# Patient Record
Sex: MALE | Race: WHITE | NOT HISPANIC OR LATINO | URBAN - METROPOLITAN AREA
[De-identification: names, ages, dates, MRNs, and addresses within clinical notes are randomized per-mention and may not be internally consistent; named-entity substitution may affect disease eponyms.]

---

## 2017-11-01 ENCOUNTER — HOSPITAL ENCOUNTER (OUTPATIENT)
Dept: PAIN MEDICINE | Facility: HOSPITAL | Age: 42
Discharge: HOME | End: 2017-11-01
Attending: ANESTHESIOLOGY | Admitting: ANESTHESIOLOGY

## 2017-11-30 ENCOUNTER — HOSPITAL ENCOUNTER (OUTPATIENT)
Dept: PAIN MEDICINE | Facility: HOSPITAL | Age: 42
Discharge: HOME | End: 2017-11-30
Attending: ANESTHESIOLOGY | Admitting: ANESTHESIOLOGY

## 2019-12-05 ENCOUNTER — APPOINTMENT (OUTPATIENT)
Dept: CT IMAGING | Facility: HOSPITAL | Age: 44
End: 2019-12-05

## 2019-12-05 ENCOUNTER — APPOINTMENT (OUTPATIENT)
Dept: GENERAL RADIOLOGY | Facility: HOSPITAL | Age: 44
End: 2019-12-05

## 2019-12-05 ENCOUNTER — HOSPITAL ENCOUNTER (OUTPATIENT)
Facility: HOSPITAL | Age: 44
Setting detail: OBSERVATION
Discharge: HOME | End: 2019-12-07
Attending: INTERNAL MEDICINE | Admitting: INTERNAL MEDICINE

## 2019-12-05 DIAGNOSIS — R07.9 RIGHT-SIDED CHEST PAIN: ICD-10-CM

## 2019-12-05 DIAGNOSIS — R91.8 MASS OF RIGHT LUNG: Primary | ICD-10-CM

## 2019-12-05 DIAGNOSIS — R91.8 MASS OF MIDDLE LOBE OF RIGHT LUNG: ICD-10-CM

## 2019-12-05 LAB
ALBUMIN SERPL-MCNC: 4.4 G/DL (ref 3.5–5.2)
ALBUMIN/GLOB SERPL: 1.5 G/DL
ALP SERPL-CCNC: 71 U/L (ref 39–117)
ALT SERPL W P-5'-P-CCNC: 7 U/L (ref 1–41)
ANION GAP SERPL CALCULATED.3IONS-SCNC: 10 MMOL/L (ref 5–15)
AST SERPL-CCNC: 13 U/L (ref 1–40)
BASOPHILS # BLD AUTO: 0.1 10*3/MM3 (ref 0–0.2)
BASOPHILS NFR BLD AUTO: 0.7 % (ref 0–1.5)
BILIRUB SERPL-MCNC: 0.5 MG/DL (ref 0.2–1.2)
BUN BLD-MCNC: 10 MG/DL (ref 6–20)
BUN/CREAT SERPL: 13 (ref 7–25)
CALCIUM SPEC-SCNC: 9.4 MG/DL (ref 8.6–10.5)
CHLORIDE SERPL-SCNC: 99 MMOL/L (ref 98–107)
CO2 SERPL-SCNC: 29 MMOL/L (ref 22–29)
CREAT BLD-MCNC: 0.77 MG/DL (ref 0.76–1.27)
D DIMER PPP FEU-MCNC: 0.85 MCGFEU/ML (ref 0.17–0.59)
DEPRECATED RDW RBC AUTO: 41.1 FL (ref 37–54)
EOSINOPHIL # BLD AUTO: 0.3 10*3/MM3 (ref 0–0.4)
EOSINOPHIL NFR BLD AUTO: 3 % (ref 0.3–6.2)
ERYTHROCYTE [DISTWIDTH] IN BLOOD BY AUTOMATED COUNT: 12.6 % (ref 12.3–15.4)
GFR SERPL CREATININE-BSD FRML MDRD: 109 ML/MIN/1.73
GLOBULIN UR ELPH-MCNC: 2.9 GM/DL
GLUCOSE BLD-MCNC: 140 MG/DL (ref 65–99)
HCT VFR BLD AUTO: 42.1 % (ref 37.5–51)
HGB BLD-MCNC: 14.8 G/DL (ref 13–17.7)
HOLD SPECIMEN: NORMAL
HOLD SPECIMEN: NORMAL
LYMPHOCYTES # BLD AUTO: 1.9 10*3/MM3 (ref 0.7–3.1)
LYMPHOCYTES NFR BLD AUTO: 21.5 % (ref 19.6–45.3)
MCH RBC QN AUTO: 32.5 PG (ref 26.6–33)
MCHC RBC AUTO-ENTMCNC: 35.2 G/DL (ref 31.5–35.7)
MCV RBC AUTO: 92.4 FL (ref 79–97)
MONOCYTES # BLD AUTO: 0.9 10*3/MM3 (ref 0.1–0.9)
MONOCYTES NFR BLD AUTO: 9.9 % (ref 5–12)
NEUTROPHILS # BLD AUTO: 5.7 10*3/MM3 (ref 1.7–7)
NEUTROPHILS NFR BLD AUTO: 64.9 % (ref 42.7–76)
NRBC BLD AUTO-RTO: 0.1 /100 WBC (ref 0–0.2)
PLATELET # BLD AUTO: 226 10*3/MM3 (ref 140–450)
PMV BLD AUTO: 7.4 FL (ref 6–12)
POTASSIUM BLD-SCNC: 3.6 MMOL/L (ref 3.5–5.2)
PROT SERPL-MCNC: 7.3 G/DL (ref 6–8.5)
RBC # BLD AUTO: 4.56 10*6/MM3 (ref 4.14–5.8)
SODIUM BLD-SCNC: 138 MMOL/L (ref 136–145)
TROPONIN T SERPL-MCNC: <0.01 NG/ML (ref 0–0.03)
WBC NRBC COR # BLD: 8.7 10*3/MM3 (ref 3.4–10.8)
WHOLE BLOOD HOLD SPECIMEN: NORMAL
WHOLE BLOOD HOLD SPECIMEN: NORMAL

## 2019-12-05 PROCEDURE — 99284 EMERGENCY DEPT VISIT MOD MDM: CPT

## 2019-12-05 PROCEDURE — 80053 COMPREHEN METABOLIC PANEL: CPT | Performed by: NURSE PRACTITIONER

## 2019-12-05 PROCEDURE — 84484 ASSAY OF TROPONIN QUANT: CPT | Performed by: NURSE PRACTITIONER

## 2019-12-05 PROCEDURE — 85379 FIBRIN DEGRADATION QUANT: CPT | Performed by: NURSE PRACTITIONER

## 2019-12-05 PROCEDURE — 0 IOPAMIDOL PER 1 ML: Performed by: NURSE PRACTITIONER

## 2019-12-05 PROCEDURE — 71045 X-RAY EXAM CHEST 1 VIEW: CPT

## 2019-12-05 PROCEDURE — 85025 COMPLETE CBC W/AUTO DIFF WBC: CPT | Performed by: NURSE PRACTITIONER

## 2019-12-05 PROCEDURE — 93005 ELECTROCARDIOGRAM TRACING: CPT | Performed by: INTERNAL MEDICINE

## 2019-12-05 PROCEDURE — 71275 CT ANGIOGRAPHY CHEST: CPT

## 2019-12-05 PROCEDURE — 93005 ELECTROCARDIOGRAM TRACING: CPT

## 2019-12-05 RX ORDER — SODIUM CHLORIDE 0.9 % (FLUSH) 0.9 %
10 SYRINGE (ML) INJECTION AS NEEDED
Status: DISCONTINUED | OUTPATIENT
Start: 2019-12-05 | End: 2019-12-07 | Stop reason: HOSPADM

## 2019-12-05 RX ORDER — MORPHINE SULFATE 4 MG/ML
4 INJECTION, SOLUTION INTRAMUSCULAR; INTRAVENOUS ONCE
Status: COMPLETED | OUTPATIENT
Start: 2019-12-05 | End: 2019-12-06

## 2019-12-05 RX ORDER — ONDANSETRON 2 MG/ML
4 INJECTION INTRAMUSCULAR; INTRAVENOUS ONCE
Status: COMPLETED | OUTPATIENT
Start: 2019-12-05 | End: 2019-12-06

## 2019-12-05 RX ADMIN — IOPAMIDOL 72 ML: 755 INJECTION, SOLUTION INTRAVENOUS at 23:04

## 2019-12-06 PROBLEM — Z72.0 TOBACCO USE: Chronic | Status: ACTIVE | Noted: 2019-12-06

## 2019-12-06 PROBLEM — R91.8 MASS OF RIGHT LUNG: Status: ACTIVE | Noted: 2019-12-06

## 2019-12-06 LAB
ANION GAP SERPL CALCULATED.3IONS-SCNC: 9 MMOL/L (ref 5–15)
BASOPHILS # BLD AUTO: 0.1 10*3/MM3 (ref 0–0.2)
BASOPHILS NFR BLD AUTO: 0.8 % (ref 0–1.5)
BUN BLD-MCNC: 8 MG/DL (ref 6–20)
BUN/CREAT SERPL: 11.8 (ref 7–25)
CALCIUM SPEC-SCNC: 9.2 MG/DL (ref 8.6–10.5)
CHLORIDE SERPL-SCNC: 102 MMOL/L (ref 98–107)
CHOLEST SERPL-MCNC: 160 MG/DL (ref 0–200)
CO2 SERPL-SCNC: 27 MMOL/L (ref 22–29)
CREAT BLD-MCNC: 0.68 MG/DL (ref 0.76–1.27)
DEPRECATED RDW RBC AUTO: 41.1 FL (ref 37–54)
EOSINOPHIL # BLD AUTO: 0.2 10*3/MM3 (ref 0–0.4)
EOSINOPHIL NFR BLD AUTO: 2.7 % (ref 0.3–6.2)
ERYTHROCYTE [DISTWIDTH] IN BLOOD BY AUTOMATED COUNT: 12.4 % (ref 12.3–15.4)
GFR SERPL CREATININE-BSD FRML MDRD: 126 ML/MIN/1.73
GLUCOSE BLD-MCNC: 103 MG/DL (ref 65–99)
HBA1C MFR BLD: 4.9 % (ref 3.5–5.6)
HCT VFR BLD AUTO: 43.2 % (ref 37.5–51)
HDLC SERPL-MCNC: 54 MG/DL (ref 40–60)
HGB BLD-MCNC: 14.9 G/DL (ref 13–17.7)
LDLC SERPL CALC-MCNC: 95 MG/DL (ref 0–100)
LDLC/HDLC SERPL: 1.76 {RATIO}
LYMPHOCYTES # BLD AUTO: 1.3 10*3/MM3 (ref 0.7–3.1)
LYMPHOCYTES NFR BLD AUTO: 15.3 % (ref 19.6–45.3)
MAGNESIUM SERPL-MCNC: 2 MG/DL (ref 1.6–2.6)
MCH RBC QN AUTO: 32.6 PG (ref 26.6–33)
MCHC RBC AUTO-ENTMCNC: 34.5 G/DL (ref 31.5–35.7)
MCV RBC AUTO: 94.5 FL (ref 79–97)
MONOCYTES # BLD AUTO: 0.6 10*3/MM3 (ref 0.1–0.9)
MONOCYTES NFR BLD AUTO: 6.8 % (ref 5–12)
NEUTROPHILS # BLD AUTO: 6.5 10*3/MM3 (ref 1.7–7)
NEUTROPHILS NFR BLD AUTO: 74.4 % (ref 42.7–76)
NRBC BLD AUTO-RTO: 0 /100 WBC (ref 0–0.2)
PHOSPHATE SERPL-MCNC: 2.7 MG/DL (ref 2.5–4.5)
PLATELET # BLD AUTO: 220 10*3/MM3 (ref 140–450)
PMV BLD AUTO: 7.6 FL (ref 6–12)
POTASSIUM BLD-SCNC: 4.3 MMOL/L (ref 3.5–5.2)
RBC # BLD AUTO: 4.57 10*6/MM3 (ref 4.14–5.8)
SODIUM BLD-SCNC: 138 MMOL/L (ref 136–145)
TRIGL SERPL-MCNC: 54 MG/DL (ref 0–150)
TROPONIN T SERPL-MCNC: <0.01 NG/ML (ref 0–0.03)
TROPONIN T SERPL-MCNC: <0.01 NG/ML (ref 0–0.03)
TSH SERPL DL<=0.05 MIU/L-ACNC: 2.69 UIU/ML (ref 0.27–4.2)
VLDLC SERPL-MCNC: 10.8 MG/DL
WBC NRBC COR # BLD: 8.7 10*3/MM3 (ref 3.4–10.8)

## 2019-12-06 PROCEDURE — 84100 ASSAY OF PHOSPHORUS: CPT | Performed by: PHYSICIAN ASSISTANT

## 2019-12-06 PROCEDURE — 96365 THER/PROPH/DIAG IV INF INIT: CPT

## 2019-12-06 PROCEDURE — 84443 ASSAY THYROID STIM HORMONE: CPT | Performed by: PHYSICIAN ASSISTANT

## 2019-12-06 PROCEDURE — 96375 TX/PRO/DX INJ NEW DRUG ADDON: CPT

## 2019-12-06 PROCEDURE — G0378 HOSPITAL OBSERVATION PER HR: HCPCS

## 2019-12-06 PROCEDURE — 25010000002 MORPHINE PER 10 MG: Performed by: NURSE PRACTITIONER

## 2019-12-06 PROCEDURE — 25010000002 CEFTRIAXONE PER 250 MG: Performed by: NURSE PRACTITIONER

## 2019-12-06 PROCEDURE — 80061 LIPID PANEL: CPT | Performed by: PHYSICIAN ASSISTANT

## 2019-12-06 PROCEDURE — 99220 PR INITIAL OBSERVATION CARE/DAY 70 MINUTES: CPT | Performed by: INTERNAL MEDICINE

## 2019-12-06 PROCEDURE — 84484 ASSAY OF TROPONIN QUANT: CPT | Performed by: PHYSICIAN ASSISTANT

## 2019-12-06 PROCEDURE — 96366 THER/PROPH/DIAG IV INF ADDON: CPT

## 2019-12-06 PROCEDURE — 80048 BASIC METABOLIC PNL TOTAL CA: CPT | Performed by: PHYSICIAN ASSISTANT

## 2019-12-06 PROCEDURE — 83036 HEMOGLOBIN GLYCOSYLATED A1C: CPT | Performed by: PHYSICIAN ASSISTANT

## 2019-12-06 PROCEDURE — 96376 TX/PRO/DX INJ SAME DRUG ADON: CPT

## 2019-12-06 PROCEDURE — 83735 ASSAY OF MAGNESIUM: CPT | Performed by: PHYSICIAN ASSISTANT

## 2019-12-06 PROCEDURE — 25010000002 ONDANSETRON PER 1 MG: Performed by: NURSE PRACTITIONER

## 2019-12-06 PROCEDURE — 85025 COMPLETE CBC W/AUTO DIFF WBC: CPT | Performed by: PHYSICIAN ASSISTANT

## 2019-12-06 RX ORDER — DOXYCYCLINE 100 MG/1
100 TABLET ORAL EVERY 12 HOURS SCHEDULED
Status: DISCONTINUED | OUTPATIENT
Start: 2019-12-06 | End: 2019-12-07 | Stop reason: HOSPADM

## 2019-12-06 RX ORDER — ACETAMINOPHEN 650 MG/1
650 SUPPOSITORY RECTAL EVERY 4 HOURS PRN
Status: DISCONTINUED | OUTPATIENT
Start: 2019-12-06 | End: 2019-12-07 | Stop reason: HOSPADM

## 2019-12-06 RX ORDER — NICOTINE 21 MG/24HR
1 PATCH, TRANSDERMAL 24 HOURS TRANSDERMAL
Status: DISCONTINUED | OUTPATIENT
Start: 2019-12-06 | End: 2019-12-07 | Stop reason: HOSPADM

## 2019-12-06 RX ORDER — ACETAMINOPHEN 325 MG/1
650 TABLET ORAL EVERY 4 HOURS PRN
Status: DISCONTINUED | OUTPATIENT
Start: 2019-12-06 | End: 2019-12-07 | Stop reason: HOSPADM

## 2019-12-06 RX ORDER — CHOLECALCIFEROL (VITAMIN D3) 125 MCG
5 CAPSULE ORAL NIGHTLY PRN
Status: DISCONTINUED | OUTPATIENT
Start: 2019-12-06 | End: 2019-12-07 | Stop reason: HOSPADM

## 2019-12-06 RX ORDER — SODIUM CHLORIDE 0.9 % (FLUSH) 0.9 %
10 SYRINGE (ML) INJECTION EVERY 12 HOURS SCHEDULED
Status: DISCONTINUED | OUTPATIENT
Start: 2019-12-06 | End: 2019-12-07 | Stop reason: HOSPADM

## 2019-12-06 RX ORDER — BISACODYL 10 MG
10 SUPPOSITORY, RECTAL RECTAL DAILY PRN
Status: DISCONTINUED | OUTPATIENT
Start: 2019-12-06 | End: 2019-12-07 | Stop reason: HOSPADM

## 2019-12-06 RX ORDER — DOCUSATE SODIUM 100 MG/1
100 CAPSULE, LIQUID FILLED ORAL 2 TIMES DAILY PRN
Status: DISCONTINUED | OUTPATIENT
Start: 2019-12-06 | End: 2019-12-07 | Stop reason: HOSPADM

## 2019-12-06 RX ORDER — SODIUM CHLORIDE 0.9 % (FLUSH) 0.9 %
10 SYRINGE (ML) INJECTION AS NEEDED
Status: DISCONTINUED | OUTPATIENT
Start: 2019-12-06 | End: 2019-12-07 | Stop reason: HOSPADM

## 2019-12-06 RX ORDER — CALCIUM CARBONATE 200(500)MG
1 TABLET,CHEWABLE ORAL 2 TIMES DAILY PRN
Status: DISCONTINUED | OUTPATIENT
Start: 2019-12-06 | End: 2019-12-07 | Stop reason: HOSPADM

## 2019-12-06 RX ORDER — NITROGLYCERIN 0.4 MG/1
0.4 TABLET SUBLINGUAL
Status: DISCONTINUED | OUTPATIENT
Start: 2019-12-06 | End: 2019-12-07 | Stop reason: HOSPADM

## 2019-12-06 RX ORDER — ONDANSETRON 2 MG/ML
4 INJECTION INTRAMUSCULAR; INTRAVENOUS EVERY 6 HOURS PRN
Status: DISCONTINUED | OUTPATIENT
Start: 2019-12-06 | End: 2019-12-07 | Stop reason: HOSPADM

## 2019-12-06 RX ORDER — IPRATROPIUM BROMIDE AND ALBUTEROL SULFATE 2.5; .5 MG/3ML; MG/3ML
3 SOLUTION RESPIRATORY (INHALATION) EVERY 4 HOURS PRN
Status: DISCONTINUED | OUTPATIENT
Start: 2019-12-06 | End: 2019-12-07 | Stop reason: HOSPADM

## 2019-12-06 RX ORDER — ACETAMINOPHEN 160 MG/5ML
650 SOLUTION ORAL EVERY 4 HOURS PRN
Status: DISCONTINUED | OUTPATIENT
Start: 2019-12-06 | End: 2019-12-07 | Stop reason: HOSPADM

## 2019-12-06 RX ORDER — ONDANSETRON 4 MG/1
4 TABLET, FILM COATED ORAL EVERY 6 HOURS PRN
Status: DISCONTINUED | OUTPATIENT
Start: 2019-12-06 | End: 2019-12-07 | Stop reason: HOSPADM

## 2019-12-06 RX ADMIN — MORPHINE SULFATE 4 MG: 4 INJECTION INTRAVENOUS at 00:09

## 2019-12-06 RX ADMIN — CEFTRIAXONE SODIUM 1 G: 10 INJECTION, POWDER, FOR SOLUTION INTRAVENOUS at 00:09

## 2019-12-06 RX ADMIN — Medication 10 ML: at 09:32

## 2019-12-06 RX ADMIN — ACETAMINOPHEN 650 MG: 325 TABLET ORAL at 20:39

## 2019-12-06 RX ADMIN — DOXYCYCLINE 100 MG: 100 INJECTION, POWDER, LYOPHILIZED, FOR SOLUTION INTRAVENOUS at 10:24

## 2019-12-06 RX ADMIN — NICOTINE 1 PATCH: 21 PATCH TRANSDERMAL at 18:41

## 2019-12-06 RX ADMIN — Medication 10 ML: at 20:40

## 2019-12-06 RX ADMIN — ONDANSETRON 4 MG: 2 INJECTION INTRAMUSCULAR; INTRAVENOUS at 00:09

## 2019-12-06 RX ADMIN — DOXYCYCLINE 100 MG: 100 INJECTION, POWDER, LYOPHILIZED, FOR SOLUTION INTRAVENOUS at 01:46

## 2019-12-06 RX ADMIN — MELATONIN TAB 5 MG 5 MG: 5 TAB at 23:40

## 2019-12-06 RX ADMIN — Medication 10 ML: at 01:47

## 2019-12-06 RX ADMIN — DOXYCYCLINE 100 MG: 100 TABLET, FILM COATED ORAL at 20:39

## 2019-12-06 NOTE — PROGRESS NOTES
Discharge Planning Assessment   Jan     Patient Name: Kendrick Medina  MRN: [id number]  Today's Date: 12/6/2019    Admit Date: 12/5/2019    Discharge Needs Assessment     Row Name 12/06/19 0815       Living Environment    Lives With  spouse    Name(s) of Who Lives With Patient  yayo     Current Living Arrangements  home/apartment/condo    Primary Care Provided by  self    Provides Primary Care For  no one    Family Caregiver if Needed  none    Quality of Family Relationships  supportive    Able to Return to Prior Arrangements  yes       Resource/Environmental Concerns    Resource/Environmental Concerns  none    Transportation Concerns  car, none       Transition Planning    Patient/Family Anticipates Transition to  home with family    Patient/Family Anticipated Services at Transition  none    Transportation Anticipated  family or friend will provide       Discharge Needs Assessment    Readmission Within the Last 30 Days  no previous admission in last 30 days    Concerns to be Addressed  no discharge needs identified;denies needs/concerns at this time    Equipment Currently Used at Home  none    Anticipated Changes Related to Illness  none    Equipment Needed After Discharge  none        Discharge Plan     Row Name 12/06/19 0815       Plan    Plan  DC plan: Return home with spouse    Patient/Family in Agreement with Plan  yes    Plan Comments  Patient states he lives with his spouse.  Reports he still drives.  Denies taking any medications.  Denies any needs at this time.           Demographic Summary     Row Name 12/06/19 0814       General Information    Admission Type  observation    Arrived From  emergency department    Referral Source  admission list    Reason for Consult  discharge planning    Preferred Language  English     Used During This Interaction  no        Functional Status     Row Name 12/06/19 0814       Functional Status    Usual Activity Tolerance  excellent    Current Activity  Tolerance  excellent       Functional Status, IADL    Medications  independent    Meal Preparation  independent    Housekeeping  independent    Laundry  independent    Shopping  independent       Mental Status    General Appearance WDL  WDL       Mental Status Summary    Recent Changes in Mental Status/Cognitive Functioning  no changes       Employment/    Employment Status  employed full time          RETURN CONTACT  JAVIER CHANCE RN  [facility] /    PH: [phone]  FX: [phone]

## 2019-12-06 NOTE — PLAN OF CARE
Problem: Patient Care Overview  Goal: Plan of Care Review  Outcome: Ongoing (interventions implemented as appropriate)   12/06/19 0533   Coping/Psychosocial   Plan of Care Reviewed With patient   Coping/Psychosocial   Patient Agreement with Plan of Care agrees   Plan of Care Review   Progress no change   OTHER   Outcome Summary Patient slept throughout the night. Patient voiced no new concerns and no new physical changes noted.     Goal: Individualization and Mutuality  Outcome: Ongoing (interventions implemented as appropriate)    Goal: Discharge Needs Assessment  Outcome: Ongoing (interventions implemented as appropriate)      Problem: Pain, Acute (Adult)  Goal: Identify Related Risk Factors and Signs and Symptoms  Outcome: Outcome(s) achieved Date Met: 12/06/19    Goal: Acceptable Pain Control/Comfort Level  Outcome: Outcome(s) achieved Date Met: 12/06/19

## 2019-12-06 NOTE — ED PROVIDER NOTES
Subjective   Patient is a 44-year-old white male with no significant medical history he smokes a pack per day presents today with complaints of right-sided chest pain.  He states his symptoms started mildly 1 week ago but is progressively worsened.  He states it radiates into his right shoulder blade.  He reports his pain is worse when he lies down, bends over or moves.  Also complains of pain with inspiration.  He denies any shortness of breath.  He states he developed a nonproductive cough approximately 4 hours ago.  Denies any fever chills nausea or vomiting.  Denies any lower extremity pain or edema.            Review of Systems   Constitutional: Negative for chills and fever.   HENT: Negative for congestion.    Respiratory: Positive for cough. Negative for shortness of breath.    Cardiovascular: Positive for chest pain. Negative for leg swelling.   Gastrointestinal: Negative for nausea and vomiting.       Past Medical History:   Diagnosis Date   • History of degenerative disc disease        Allergies   Allergen Reactions   • Celecoxib Swelling     Hands and face        Past Surgical History:   Procedure Laterality Date   • CARPAL TUNNEL RELEASE WITH CUBITAL TUNNEL RELEASE         History reviewed. No pertinent family history.    Social History     Socioeconomic History   • Marital status: Single     Spouse name: Not on file   • Number of children: Not on file   • Years of education: Not on file   • Highest education level: Not on file   Tobacco Use   • Smoking status: Current Every Day Smoker     Packs/day: 1.00     Types: Cigarettes   • Smokeless tobacco: Never Used   Substance and Sexual Activity   • Alcohol use: Yes     Alcohol/week: 1.2 oz     Types: 2 Cans of beer per week     Frequency: Never     Comment: daily   • Drug use: No           Objective   Physical Exam   Constitutional: He appears well-developed.   Vital signs and triage nurse note reviewed.  Constitutional: Awake, alert; well-developed and  thin. No acute distress is noted.  HEENT: Normocephalic, atraumatic; pupils are PERRL with intact EOM; oropharynx is pink and moist without exudate or erythema.  No drooling or pooling of oral secretions.  Neck: Supple, full range of motion without pain; no cervical lymphadenopathy. Normal phonation.  Cardiovascular: Regular rate and rhythm, normal S1-S2.  No murmur noted.  Pulmonary: Respiratory effort regular nonlabored, breath sounds clear to auscultation all fields.  Abdomen: Soft, nontender, nondistended with normoactive bowel sounds; no rebound or guarding.  Musculoskeletal: Independent range of motion of all extremities with no palpable tenderness or edema.  Calves are symmetric and nontender.  Neuro: Alert oriented x3, speech is clear and appropriate, GCS 15.    Skin: Flesh tone, warm, dry, intact; no erythematous or petechial rash or lesion.        Procedures           ED Course      Labs Reviewed   COMPREHENSIVE METABOLIC PANEL - Abnormal; Notable for the following components:       Result Value    Glucose 140 (*)     All other components within normal limits    Narrative:     GFR Normal >60  Chronic Kidney Disease <60  Kidney Failure <15   D-DIMER, QUANTITATIVE - Abnormal; Notable for the following components:    D-Dimer, Quantitative 0.85 (*)     All other components within normal limits    Narrative:     Reference Range  --------------------------------------------------------------------     < 0.50   Negative Predictive Value  0.50-0.59   Indeterminate    >= 0.60   Probable VTE             A very low percentage of patients with DVT may yield D-Dimer results   below the cut-off of 0.50 MCGFEU/mL.  This is known to be more   prevalent in patients with distal DVT.             Results of this test should always be interpreted in conjunction with   the patient's medical history, clinical presentation and other   findings.  Clinical diagnosis should not be based on the result of   INNOVANCE D-Dimer alone.    TROPONIN (IN-HOUSE) - Normal    Narrative:     Troponin T Reference Range:  <= 0.03 ng/mL-   Negative for AMI  >0.03 ng/mL-     Abnormal for myocardial necrosis.  Clinicians would have to utilize clinical acumen, EKG, Troponin and serial changes to determine if it is an Acute Myocardial Infarction or myocardial injury due to an underlying chronic condition.    CBC WITH AUTO DIFFERENTIAL - Normal   RAINBOW DRAW    Narrative:     The following orders were created for panel order [place] Draw.  Procedure                               Abnormality         Status                     ---------                               -----------         ------                     Light Blue Top[[id number]]                                   Final result               Green Top (Gel)[[id number]]                                  Final result               Lavender Top[[id number]]                                     Final result               Gold Top - SST[[id number]]                                   Final result                 Please view results for these tests on the individual orders.   BASIC METABOLIC PANEL   HEMOGLOBIN A1C   LIPID PANEL   TSH   MAGNESIUM   PHOSPHORUS   CBC WITH AUTO DIFFERENTIAL   LIGHT BLUE TOP   GREEN TOP   LAVENDER TOP   GOLD TOP - SST   CBC AND DIFFERENTIAL    Narrative:     The following orders were created for panel order CBC & Differential.  Procedure                               Abnormality         Status                     ---------                               -----------         ------                     CBC Auto Differential[[id number]]        Normal              Final result                 Please view results for these tests on the individual orders.   CBC AND DIFFERENTIAL    Narrative:     The following orders were created for panel order CBC & Differential.  Procedure                               Abnormality         Status                     ---------                               -----------          ------                     CBC Auto Differential[[id number]]                                                         Please view results for these tests on the individual orders.     Xr Chest 1 View    Result Date: 12/5/2019  No active cardiopulmonary disease.   Electronically Signed By-Billy George DO. On:12/5/2019 10:42 PM This report was finalized on [id number] by  Billy George DO..    Ct Chest Pulmonary Embolism    Result Date: 12/5/2019  1.  There is a 4 x 3.2 x 2.9 cm mass medially in the right middle lobe.  This could represent consolidated pneumonia or tumor.Lesion is 33 Hounsfield units in density, too high to be a simple pericardial cyst. 2.  No evidence of pulmonary embolism. Electronically signed by:  Alfonso Ramirez M.D.  12/5/2019 9:32 PM    Medications   sodium chloride 0.9 % flush 10 mL (not administered)   sodium chloride 0.9 % flush 10 mL (not administered)   doxycycline (VIBRAMYCIN) 100 mg in sodium chloride 0.9 % 100 mL IVPB (not administered)   sodium chloride 0.9 % flush 10 mL (not administered)   sodium chloride 0.9 % flush 10 mL (not administered)   docusate sodium (COLACE) capsule 100 mg (not administered)   bisacodyl (DULCOLAX) suppository 10 mg (not administered)   magnesium hydroxide (MILK OF MAGNESIA) suspension 2400 mg/10mL 10 mL (not administered)   ondansetron (ZOFRAN) tablet 4 mg (not administered)     Or   ondansetron (ZOFRAN) injection 4 mg (not administered)   calcium carbonate (TUMS) chewable tablet 500 mg (200 mg elemental) (not administered)   nitroglycerin (NITROSTAT) SL tablet 0.4 mg (not administered)   acetaminophen (TYLENOL) tablet 650 mg (not administered)     Or   acetaminophen (TYLENOL) 160 MG/5ML solution 650 mg (not administered)     Or   acetaminophen (TYLENOL) suppository 650 mg (not administered)   melatonin tablet 5 mg (not administered)   iopamidol (ISOVUE-370) 76 % injection 100 mL (72 mL Intravenous Given 12/5/19 7017)   cefTRIAXone (ROCEPHIN) in  SWFI 1 gram/10ml IV PUSH syringe (1 g Intravenous Given 12/6/19 0009)   Morphine sulfate (PF) injection 4 mg (4 mg Intravenous Given 12/6/19 0009)   ondansetron (ZOFRAN) injection 4 mg (4 mg Intravenous Given 12/6/19 0009)                 MDM  Number of Diagnoses or Management Options  Mass of middle lobe of right lung:   Right-sided chest pain:      Amount and/or Complexity of Data Reviewed  Clinical lab tests: ordered and reviewed  Tests in the radiology section of CPT®: ordered and reviewed    Risk of Complications, Morbidity, and/or Mortality  General comments: Comorbidities: Smoker  Differentials: Pneumonia, pneumothorax, tumor mass, PE, muscle strain;this list is not all inclusive and does not constitute the entirety of considered causes    Patient is placed on continuous cardiac monitor.  He had IV established.  He had labs, EKG chest x-ray obtained.  D-dimer returned slightly elevated this patient had CT PE protocol obtained.    Patient started on IV Rocephin and doxycycline.  Vital signs are stable.  He was also given some morphine and Zofran for pain.    He will be admitted to the hospital for further eval and treatment.  He is discussed with hospitalist nurse practitioner.  Diagnosis and treatment plan discussed with patient.  Patient agreeable to plan.         Patient Progress  Patient progress: stable      Final diagnoses:   Right-sided chest pain   Mass of middle lobe of right lung              Nasima Gonzales, JORGE  12/06/19 0140

## 2019-12-06 NOTE — ASSESSMENT & PLAN NOTE
"- admission vitals were 98.2F, 112HR, 18RR, 111/70, 97%RA.   - On labs, patient found to have negative troponin, elevated D-dimer 0.85, stable CMP, and stable CBC.   - CXR shows No acute disease.   - CT PE shows \"1.  There is a 4 x 3.2 x 2.9 cm mass medially in the right middle lobe.  This could represent consolidated pneumonia or tumor. Lesion is 33 Hounsfield units in density, too high to be a simple pericardial cyst. 2.  No evidence of pulmonary embolism.\"   - EKG shows sinus tachycardia without ST-T changes appreciated.   - Patient was given Rocephin, Doxycycline, zofran and morphine in the ED.    - Serial troponins and continuous cardiac monitor ordered to r/o ACS  - Continued IV rocephin and Doxycycline for possible infection. Will order mucinex for pulmonary toilet and  Sputum culture if possible  - PRN breathing treatments ordered       "

## 2019-12-06 NOTE — PLAN OF CARE
Problem: Patient Care Overview  Goal: Individualization and Mutuality  Outcome: Ongoing (interventions implemented as appropriate)    Goal: Discharge Needs Assessment  Outcome: Ongoing (interventions implemented as appropriate)      Problem: Pain, Acute (Adult)  Goal: Acceptable Pain Control/Comfort Level  Outcome: Ongoing (interventions implemented as appropriate)      Problem: Patient Care Overview  Goal: Plan of Care Review  Outcome: Ongoing (interventions implemented as appropriate)    Goal: Individualization and Mutuality  Outcome: Ongoing (interventions implemented as appropriate)    Goal: Discharge Needs Assessment  Outcome: Ongoing (interventions implemented as appropriate)      Problem: Pain, Chronic (Adult)  Goal: Identify Related Risk Factors and Signs and Symptoms  Outcome: Outcome(s) achieved Date Met: 12/06/19    Goal: Acceptable Pain/Comfort Level and Functional Ability  Outcome: Ongoing (interventions implemented as appropriate)

## 2019-12-06 NOTE — H&P
"      [facility] Medicine Services      Patient Name: Kendrick Medina  : 1975  MRN: [id number]  Primary Care Physician: Lea, No Known  Date of admission: 2019    Patient Care Team:  Provider, No Known as PCP - General          Subjective   History Present Illness     Chief Complaint:   Chief Complaint   Patient presents with   • Chest Pain     x 2 days       Mr. Medina is a 44 y.o.  presents to [facility] complaining of chest pain that has been intermittent and \"pressure\" for 1 week located substernally and relocating to right side of chest without radiation. Patient reports that on the AM of 19 he was suddenly awoken from sleep by \"sharp\" right sided chest pain with dyspnea that was constant during the day and worse with deep inspiration and movemenets and with laying flat on back. Patient denies anything improving the pain. He reports that he had associated \"fogginess\" and \"felt off\" during the day without any further specifics. Patient reports that he is a daily smoker but decreased the amount of smoking during the day related to the chest pain and SOA. Patient denies any fever, chills, cough, Sputum, weight loss/weight gain, abdominal pain, N/V/D/C, or peripheral edema. Patient denies any recent trauma, hospitalizations, ABX use, or sick contacts. He reports that he is a [employer]  for work.     In ED, admission vitals were 98.2F, 112HR, 18RR, 111/70, 97%RA. On labs, patient found to have negative troponin, elevated D-dimer 0.85, stable CMP, and stable CBC. CXR shows No acute disease. CT PE shows \"1.  There is a 4 x 3.2 x 2.9 cm mass medially in the right middle lobe.  This could represent consolidated pneumonia or tumor.Lesion is 33 Hounsfield units in density, too high to be a simple pericardial cyst. 2.  No evidence of pulmonary embolism.\" EKG shows sinus tachycardia without ST-T changes appreciated. Patient was given Rocephin, Doxycycline, zofran and " morphine in the ED.       History of Present Illness    Review of Systems   All other systems reviewed and are negative.          Personal History     Past Medical History:   Past Medical History:   Diagnosis Date   • History of degenerative disc disease        Surgical History:      Past Surgical History:   Procedure Laterality Date   • CARPAL TUNNEL RELEASE WITH CUBITAL TUNNEL RELEASE             Family History: family history is not on file. Otherwise pertinent FHx was reviewed and unremarkable.     Social History:  reports that he has been smoking cigarettes.  He has been smoking about 1.00 pack per day. He has never used smokeless tobacco. He reports that he drinks about 1.2 oz of alcohol per week. He reports that he does not use drugs.      Medications:  Prior to Admission medications    Not on File       Allergies:    Allergies   Allergen Reactions   • Celecoxib Swelling     Hands and face        Objective   Objective     Vital Signs  Temp:  [97.8 °F (36.6 °C)-98.2 °F (36.8 °C)] 97.8 °F (36.6 °C)  Heart Rate:  [] 78  Resp:  [18] 18  BP: ()/(61-70) 99/61  SpO2:  [97 %] 97 %  on   ;   Device (Oxygen Therapy): room air  Body mass index is 18.29 kg/m².    Physical Exam   Constitutional: He is oriented to person, place, and time. He appears well-developed and well-nourished. No distress.   HENT:   Head: Normocephalic and atraumatic.   Right Ear: External ear normal.   Left Ear: External ear normal.   Nose: Nose normal.   Mouth/Throat: Oropharynx is clear and moist. No oropharyngeal exudate.   Eyes: Conjunctivae and EOM are normal. Right eye exhibits no discharge. Left eye exhibits no discharge. No scleral icterus.   Neck: Neck supple.   Cardiovascular: Normal rate, regular rhythm, normal heart sounds and intact distal pulses. Exam reveals no gallop and no friction rub.   No murmur heard.  Pulmonary/Chest: Effort normal and breath sounds normal. No stridor. No respiratory distress. He has no wheezes. He  has no rales. He exhibits no tenderness.   Abdominal: Soft. Bowel sounds are normal. He exhibits no distension and no mass. There is no tenderness. There is no rebound and no guarding.   Musculoskeletal: Normal range of motion. He exhibits no edema, tenderness or deformity.   Neurological: He is alert and oriented to person, place, and time. No cranial nerve deficit.   Skin: Skin is warm and dry. No rash noted. He is not diaphoretic. No erythema. No pallor.   Psychiatric: He has a normal mood and affect. His behavior is normal. Judgment and thought content normal.   Vitals reviewed.      Results Review:  I have personally reviewed most recent cardiac tracings, lab results and radiology images and interpretations and agree with findings.    Results from last 7 days   Lab Units 12/05/19  2152   WBC 10*3/mm3 8.70   HEMOGLOBIN g/dL 14.8   HEMATOCRIT % 42.1   PLATELETS 10*3/mm3 226     Results from last 7 days   Lab Units 12/05/19  2152   SODIUM mmol/L 138   POTASSIUM mmol/L 3.6   CHLORIDE mmol/L 99   CO2 mmol/L 29.0   BUN mg/dL 10   CREATININE mg/dL 0.77   GLUCOSE mg/dL 140*   CALCIUM mg/dL 9.4   ALT (SGPT) U/L 7   AST (SGOT) U/L 13   TROPONIN T ng/mL <0.010     Estimated Creatinine Clearance: 87.2 mL/min (by C-G formula based on SCr of 0.77 mg/dL).  Brief Urine Lab Results     None          Microbiology Results (last 10 days)     ** No results found for the last 240 hours. **          ECG/EMG Results (most recent)     Procedure Component Value Units Date/Time    ECG 12 Lead [[id number]] Collected:  12/05/19 2115     Updated:  12/05/19 2117    Narrative:       HEART RATE= 103  bpm  RR Interval= 584  ms  NJ Interval= 135  ms  P Horizontal Axis= 4  deg  P Front Axis= 80  deg  QRSD Interval= 106  ms  QT Interval= 338  ms  QRS Axis= 87  deg  T Wave Axis= 59  deg  - OTHERWISE NORMAL ECG -  Sinus tachycardia  Electronically Signed By:   Date and Time of Study: 2019-12-05 21:15:44                    Xr Chest 1 View    Result  Date: 12/5/2019  No active cardiopulmonary disease.   Electronically Signed By-Billy George DO. On:12/5/2019 10:42 PM This report was finalized on [id number] by  Billy George DO..    Ct Chest Pulmonary Embolism    Result Date: 12/5/2019  1.  There is a 4 x 3.2 x 2.9 cm mass medially in the right middle lobe.  This could represent consolidated pneumonia or tumor.Lesion is 33 Hounsfield units in density, too high to be a simple pericardial cyst. 2.  No evidence of pulmonary embolism. Electronically signed by:  Alfonso Ramirez M.D.  12/5/2019 9:32 PM        Estimated Creatinine Clearance: 87.2 mL/min (by C-G formula based on SCr of 0.77 mg/dL).    Assessment/Plan   Assessment/Plan       Active Hospital Problems:  No notes have been filed under this hospital service.  Service: Hospitalist              VTE Prophylaxis - SCDs.    CODE STATUS:    Code Status and Medical Interventions:   Ordered at: 12/06/19 0040     Code Status:    CPR     Medical Interventions (Level of Support Prior to Arrest):    Full       Admission Status:  I believe this patient meets observation criteria.      I discussed the patients findings and my recommendations with patient.        Electronically signed by Nataly Rubio PA-C, 12/06/19, 1:46 AM.  [facility] Hospitalist Team

## 2019-12-07 VITALS
HEART RATE: 75 BPM | TEMPERATURE: 98.1 F | WEIGHT: 113.32 LBS | SYSTOLIC BLOOD PRESSURE: 100 MMHG | OXYGEN SATURATION: 98 % | RESPIRATION RATE: 16 BRPM | BODY MASS INDEX: 18.21 KG/M2 | HEIGHT: 66 IN | DIASTOLIC BLOOD PRESSURE: 64 MMHG

## 2019-12-07 LAB
ANION GAP SERPL CALCULATED.3IONS-SCNC: 11 MMOL/L (ref 5–15)
BASOPHILS # BLD AUTO: 0.1 10*3/MM3 (ref 0–0.2)
BASOPHILS NFR BLD AUTO: 0.8 % (ref 0–1.5)
BUN BLD-MCNC: 10 MG/DL (ref 6–20)
BUN/CREAT SERPL: 15.6 (ref 7–25)
CALCIUM SPEC-SCNC: 8.9 MG/DL (ref 8.6–10.5)
CHLORIDE SERPL-SCNC: 104 MMOL/L (ref 98–107)
CO2 SERPL-SCNC: 24 MMOL/L (ref 22–29)
CREAT BLD-MCNC: 0.64 MG/DL (ref 0.76–1.27)
DEPRECATED RDW RBC AUTO: 42 FL (ref 37–54)
EOSINOPHIL # BLD AUTO: 0.3 10*3/MM3 (ref 0–0.4)
EOSINOPHIL NFR BLD AUTO: 4.9 % (ref 0.3–6.2)
ERYTHROCYTE [DISTWIDTH] IN BLOOD BY AUTOMATED COUNT: 12.7 % (ref 12.3–15.4)
GFR SERPL CREATININE-BSD FRML MDRD: 135 ML/MIN/1.73
GLUCOSE BLD-MCNC: 112 MG/DL (ref 65–99)
HCT VFR BLD AUTO: 40.6 % (ref 37.5–51)
HGB BLD-MCNC: 14 G/DL (ref 13–17.7)
INR PPP: 0.98 (ref 0.9–1.1)
LYMPHOCYTES # BLD AUTO: 1.7 10*3/MM3 (ref 0.7–3.1)
LYMPHOCYTES NFR BLD AUTO: 23.4 % (ref 19.6–45.3)
MCH RBC QN AUTO: 32.6 PG (ref 26.6–33)
MCHC RBC AUTO-ENTMCNC: 34.5 G/DL (ref 31.5–35.7)
MCV RBC AUTO: 94.5 FL (ref 79–97)
MONOCYTES # BLD AUTO: 0.8 10*3/MM3 (ref 0.1–0.9)
MONOCYTES NFR BLD AUTO: 10.7 % (ref 5–12)
NEUTROPHILS # BLD AUTO: 4.3 10*3/MM3 (ref 1.7–7)
NEUTROPHILS NFR BLD AUTO: 60.2 % (ref 42.7–76)
NRBC BLD AUTO-RTO: 0.2 /100 WBC (ref 0–0.2)
PLATELET # BLD AUTO: 180 10*3/MM3 (ref 140–450)
PMV BLD AUTO: 7.5 FL (ref 6–12)
POTASSIUM BLD-SCNC: 4.6 MMOL/L (ref 3.5–5.2)
PROTHROMBIN TIME: 10.3 SECONDS (ref 9.6–11.7)
RBC # BLD AUTO: 4.29 10*6/MM3 (ref 4.14–5.8)
SODIUM BLD-SCNC: 139 MMOL/L (ref 136–145)
WBC NRBC COR # BLD: 7.1 10*3/MM3 (ref 3.4–10.8)

## 2019-12-07 PROCEDURE — 99217 PR OBSERVATION CARE DISCHARGE MANAGEMENT: CPT | Performed by: INTERNAL MEDICINE

## 2019-12-07 PROCEDURE — 85610 PROTHROMBIN TIME: CPT | Performed by: INTERNAL MEDICINE

## 2019-12-07 PROCEDURE — 25010000002 CEFTRIAXONE PER 250 MG: Performed by: INTERNAL MEDICINE

## 2019-12-07 PROCEDURE — 85025 COMPLETE CBC W/AUTO DIFF WBC: CPT | Performed by: PHYSICIAN ASSISTANT

## 2019-12-07 PROCEDURE — G0378 HOSPITAL OBSERVATION PER HR: HCPCS

## 2019-12-07 PROCEDURE — 96367 TX/PROPH/DG ADDL SEQ IV INF: CPT

## 2019-12-07 PROCEDURE — 80048 BASIC METABOLIC PNL TOTAL CA: CPT | Performed by: PHYSICIAN ASSISTANT

## 2019-12-07 RX ORDER — OXYCODONE HYDROCHLORIDE 5 MG/1
5 TABLET ORAL EVERY 4 HOURS PRN
Qty: 15 TABLET | Refills: 0 | Status: SHIPPED | OUTPATIENT
Start: 2019-12-07 | End: 2019-12-18

## 2019-12-07 RX ORDER — OXYCODONE HYDROCHLORIDE 5 MG/1
5 TABLET ORAL EVERY 4 HOURS PRN
Status: DISCONTINUED | OUTPATIENT
Start: 2019-12-07 | End: 2019-12-07 | Stop reason: HOSPADM

## 2019-12-07 RX ORDER — HYDROMORPHONE HCL 110MG/55ML
0.25 PATIENT CONTROLLED ANALGESIA SYRINGE INTRAVENOUS EVERY 4 HOURS PRN
Status: DISCONTINUED | OUTPATIENT
Start: 2019-12-07 | End: 2019-12-07 | Stop reason: HOSPADM

## 2019-12-07 RX ORDER — DOXYCYCLINE 100 MG/1
100 TABLET ORAL EVERY 12 HOURS SCHEDULED
Qty: 11 TABLET | Refills: 0 | Status: SHIPPED | OUTPATIENT
Start: 2019-12-07 | End: 2019-12-13

## 2019-12-07 RX ORDER — IPRATROPIUM BROMIDE AND ALBUTEROL SULFATE 2.5; .5 MG/3ML; MG/3ML
3 SOLUTION RESPIRATORY (INHALATION)
Status: DISCONTINUED | OUTPATIENT
Start: 2019-12-07 | End: 2019-12-07 | Stop reason: HOSPADM

## 2019-12-07 RX ADMIN — DOXYCYCLINE 100 MG: 100 TABLET, FILM COATED ORAL at 08:48

## 2019-12-07 RX ADMIN — CEFTRIAXONE SODIUM 1 G: 1 INJECTION, POWDER, FOR SOLUTION INTRAMUSCULAR; INTRAVENOUS at 00:05

## 2019-12-07 RX ADMIN — Medication 10 ML: at 08:48

## 2019-12-07 RX ADMIN — NICOTINE 1 PATCH: 21 PATCH TRANSDERMAL at 08:47

## 2019-12-07 NOTE — PROGRESS NOTES
[facility]   INPATIENT PROGRESS NOTE    Date of Admission: 12/5/2019  Length of Stay: 0  Primary Care Physician: Provider, No Known    Subjective   Subjective   CC: right side chest pain    HPI: Chest pain is mild and subsided  However in CT of the chest,4 x 3 cm size mass was detected at the middle lobe of the right lung.  He has h/o Smoking 25 years. Clinically malignancy should be ruled out.  Pulmonology consultation was requested.      Review Of Systems:     As pe HPI and PMH  Objective   Objective    Physical Exam:  Temp:  [97.8 °F (36.6 °C)-98.4 °F (36.9 °C)] 98.2 °F (36.8 °C)  Heart Rate:  [] 77  Resp:  [12-18] 16  BP: ()/(52-70) 104/60    Constitutional: He is oriented to person, place, and time. He appears well-developed and well-nourished. No distress.   HENT:   Head: Normocephalic and atraumatic.   Right Ear: External ear normal.   Left Ear: External ear normal.   Nose: Nose normal.   Mouth/Throat: Oropharynx is clear and moist. No oropharyngeal exudate.   Eyes: Conjunctivae and EOM are normal. Right eye exhibits no discharge. Left eye exhibits no discharge. No scleral icterus.   Neck: Neck supple.   Cardiovascular: Normal rate, regular rhythm, normal heart sounds and intact distal pulses. Exam reveals no gallop and no friction rub.   No murmur heard.  Pulmonary/Chest: Effort normal and breath sounds normal. No stridor. No respiratory distress. He has no wheezes. He has no rales. He exhibits no tenderness.   Abdominal: Soft. Bowel sounds are normal. He exhibits no distension and no mass. There is no tenderness. There is no rebound and no guarding.   Musculoskeletal: Normal range of motion. He exhibits no edema, tenderness or deformity.   Neurological: He is alert and oriented to person, place, and time. No cranial nerve deficit.   Skin: Skin is warm and dry. No rash noted. He is not diaphoretic. No erythema. No pallor.   Psychiatric: He has a normal mood     Results Review:    I have  "personally reviewed most recent lab results and agree with findings, most notably: .      Results from last 7 days   Lab Units 12/06/19  0858 12/05/19  2152   WBC 10*3/mm3 8.70 8.70   HEMOGLOBIN g/dL 14.9 14.8   HEMATOCRIT % 43.2 42.1   PLATELETS 10*3/mm3 220 226     Results from last 7 days   Lab Units 12/06/19  0858 12/05/19  2152   SODIUM mmol/L 138 138   POTASSIUM mmol/L 4.3 3.6   CHLORIDE mmol/L 102 99   CO2 mmol/L 27.0 29.0   BUN mg/dL 8 10   CREATININE mg/dL 0.68* 0.77   GLUCOSE mg/dL 103* 140*   CALCIUM mg/dL 9.2 9.4   ALK PHOS U/L  --  71   ALT (SGPT) U/L  --  7   AST (SGOT) U/L  --  13     Hemoglobin A1C (%)   Date/Time Value   12/06/2019 0858 4.9     TSH (uIU/mL)   Date/Time Value   12/06/2019 0858 2.690     Microbiology Results Abnormal     None        Xr Chest 1 View    Result Date: 12/5/2019  No active cardiopulmonary disease.   Electronically Signed By-Billy George DO. On:12/5/2019 10:42 PM This report was finalized on [id number] by  Billy George DO..    Ct Chest Pulmonary Embolism    Result Date: 12/5/2019  1.  There is a 4 x 3.2 x 2.9 cm mass medially in the right middle lobe.  This could represent consolidated pneumonia or tumor.Lesion is 33 Hounsfield units in density, too high to be a simple pericardial cyst. 2.  No evidence of pulmonary embolism. Electronically signed by:  Alfonso Ramirez M.D.  12/5/2019 9:32 PM           I have reviewed the medications.    Assessment/Plan   Assessment/Plan   Brief Hospital Course:      Active Hospital Problems:  * Right-sided chest pain- (present on admission)    - admission vitals were 98.2F, 112HR, 18RR, 111/70, 97%RA.   - On labs, patient found to have negative troponin, elevated D-dimer 0.85, stable CMP, and stable CBC.   - CXR shows No acute disease.   - CT PE shows \"1.  There is a 4 x 3.2 x 2.9 cm mass medially in the right middle lobe.  This could represent consolidated pneumonia or tumor. Lesion is 33 Hounsfield units in density, too high to be a " "simple pericardial cyst. 2.  No evidence of pulmonary embolism.\"   - EKG shows sinus tachycardia without ST-T changes appreciated.   - Patient was given Rocephin, Doxycycline, zofran and morphine in the ED.    - Serial troponins and continuous cardiac monitor ordered to r/o ACS  - Continued IV rocephin and Doxycycline for possible infection. Will order mucinex for pulmonary toilet and  Sputum culture if possible  - PRN breathing treatments ordered          Mass of right lung     4 X 3 cm size lung mass on the Right middle lob, anteriorly - in the CT scan  The appearance seems to be tumor  No fever, no cough - rack of Sx of pneumonia  Smoking HX over 25 years.    Pulmonology consultation requested  Biopsy is needed.       Tobacco use    Smoking cessation discussed and encouraged.               DVT prophylaxis:  Discharge Planning: I expect patient to be discharged soon.    Jone Bell MD, 12/06/19 8:08 PM    "

## 2019-12-07 NOTE — DISCHARGE SUMMARY
"  [facility]   DISCHARGE SUMMARY    Patient Name: Kendrick Medina  : 1975  MRN: [id number]    Date of Admission: 2019  Date of Discharge:  2019    Primary Care Physician: Provider, No Known    Consults     Date and Time Order Name Status Description    2019 1146 Inpatient Pulmonology Consult Completed     2019 3690 Hospitalist (on-call MD unless specified) Completed           Hospital Course     Presenting Problem:   Mass of middle lobe of right lung [R91.8]  Right-sided chest pain [R07.9]    Active Hospital Problems:  * Right-sided chest pain- (present on admission)  - admission vitals were 98.2F, 112HR, 18RR, 111/70, 97%RA.   - On labs, patient found to have negative troponin, elevated D-dimer 0.85, stable CMP, and stable CBC.   - CXR shows No acute disease.   - CT PE shows \"1.  There is a 4 x 3.2 x 2.9 cm mass medially in the right middle lobe.  This could represent consolidated pneumonia or tumor. Lesion is 33 Hounsfield units in density, too high to be a simple pericardial cyst. 2.  No evidence of pulmonary embolism.\"   - EKG shows sinus tachycardia without ST-T changes appreciated.   - Patient was given Rocephin, Doxycycline, zofran and morphine in the ED.    - Serial troponins and continuous cardiac monitor ordered to r/o ACS  - Continued IV rocephin and Doxycycline for possible infection. Will order mucinex for pulmonary toilet and  Sputum culture if possible  - PRN breathing treatments ordered         Mass of right lung   4 X 3 cm size lung mass on the Right middle lob, anteriorly - in the CT scan  The appearance seems to be tumor  No fever, no cough - rack of Sx of pneumonia  Smoking HX over 25 years.    Pulmonology consultation requested.  Done by Dr. Meredith.  IR needle biopsy will be done at [facility].  The patient was discharged..      Tobacco use  Smoking cessation discussed and encouraged.        Resolved Hospital Problems:  No notes have been filed under this hospital " "service.  Service: Hospitalist        Hospital Course:  Kendrick Medina is a 44 y.o. male patient was admitted with right side chest pain. The nature seemed to be pleuritic CP. He has hx of smoking over 20 years. CT scan showed 4 X 3 cm mass on right middle lobe of the lung.  Pulmonology was consulted for the evaluation.  The appearance looks like to be a malignancy.  As this is a weekend now, needle biopsy by ID was recommended at the [facility].  He is safely discharged.  Stop smoking emphasized.  He needs pain medication.          Discharge Follow Up Recommendations for labs/diagnostics:  Dr. Meredith within a week  IR lung biopsy as scheduled    Day of Discharge     HPI: Mr. Medina is a 44 y.o.  presents to [facility] complaining of chest pain that has been intermittent and \"pressure\" for 1 week located substernally and relocating to right side of chest without radiation. Patient reports that on the AM of 12/5/19 he was suddenly awoken from sleep by \"sharp\" right sided chest pain with dyspnea that was constant during the day and worse with deep inspiration and movemenets and with laying flat on back. Patient denies anything improving the pain. He reports that he had associated \"fogginess\" and \"felt off\" during the day without any further specifics. Patient reports that he is a daily smoker but decreased the amount of smoking during the day related to the chest pain and SOA. Patient denies any fever, chills, cough, Sputum, weight loss/weight gain, abdominal pain, N/V/D/C, or peripheral edema. Patient denies any recent trauma, hospitalizations, ABX use, or sick contacts. He reports that he is a [employer]  for work.      The pain is mostly related to the mass on rt Middle lobe.  Biopsy will be done at the [facility] by IR physician.      Vital Signs:   Temp:  [98.1 °F (36.7 °C)-98.4 °F (36.9 °C)] 98.1 °F (36.7 °C)  Heart Rate:  [75-90] 75  Resp:  [12-16] 16  BP: ()/(59-64) 100/64     Physical " Exam:  Constitutional: He is oriented to person, place, and time. He appears well-developed and well-nourished. No distress.   HENT:   Head: Normocephalic and atraumatic.   Right Ear: External ear normal.   Left Ear: External ear normal.   Nose: Nose normal.   Mouth/Throat: Oropharynx is clear and moist. No oropharyngeal exudate.   Eyes: Conjunctivae and EOM are normal. Right eye exhibits no discharge. Left eye exhibits no discharge. No scleral icterus.   Neck: Neck supple.   Cardiovascular: Normal rate, regular rhythm, normal heart sounds and intact distal pulses. Exam reveals no gallop and no friction rub.   No murmur heard.  Pulmonary/Chest: Effort normal and breath sounds normal. No stridor. No respiratory distress. He has no wheezes. He has no rales. He exhibits no tenderness.   Abdominal: Soft. Bowel sounds are normal. He exhibits no distension and no mass. There is no tenderness. There is no rebound and no guarding.   Musculoskeletal: Normal range of motion. He exhibits no edema, tenderness or deformity.   Neurological: He is alert and oriented to person, place, and time. No cranial nerve deficit.   Skin: Skin is warm and dry. No rash noted. He is not diaphoretic. No erythema. No pallor.   Psychiatric: He has a normal mood and affect. His behavior is normal. Judgment and thought content normal.   Vitals reviewed.       Pertinent  and/or Most Recent Results     Results from last 7 days   Lab Units 12/07/19  0422 12/06/19  0858 12/05/19  2152   WBC 10*3/mm3 7.10 8.70 8.70   HEMOGLOBIN g/dL 14.0 14.9 14.8   HEMATOCRIT % 40.6 43.2 42.1   PLATELETS 10*3/mm3 180 220 226   SODIUM mmol/L 139 138 138   POTASSIUM mmol/L 4.6 4.3 3.6   CHLORIDE mmol/L 104 102 99   CO2 mmol/L 24.0 27.0 29.0   BUN mg/dL 10 8 10   CREATININE mg/dL 0.64* 0.68* 0.77   GLUCOSE mg/dL 112* 103* 140*   CALCIUM mg/dL 8.9 9.2 9.4     Results from last 7 days   Lab Units 12/07/19  1011 12/05/19  2152   BILIRUBIN mg/dL  --  0.5   ALK PHOS U/L  --  71    ALT (SGPT) U/L  --  7   AST (SGOT) U/L  --  13   PROTIME Seconds 10.3  --    INR  0.98  --      Results from last 7 days   Lab Units 12/06/19  0858   CHOLESTEROL mg/dL 160   TRIGLYCERIDES mg/dL 54   HDL CHOL mg/dL 54     Results from last 7 days   Lab Units 12/06/19  1434 12/06/19  0858 12/05/19  2152   TSH uIU/mL  --  2.690  --    HEMOGLOBIN A1C %  --  4.9  --    TROPONIN T ng/mL <0.010 <0.010 <0.010       Brief Urine Lab Results     None          Microbiology Results Abnormal     None          Xr Chest 1 View    Result Date: 12/5/2019  Impression: No active cardiopulmonary disease.   Electronically Signed By-Billy George DO. On:12/5/2019 10:42 PM This report was finalized on [id number] by  Billy George DO..    Ct Chest Pulmonary Embolism    Result Date: 12/5/2019  Impression: 1.  There is a 4 x 3.2 x 2.9 cm mass medially in the right middle lobe.  This could represent consolidated pneumonia or tumor.Lesion is 33 Hounsfield units in density, too high to be a simple pericardial cyst. 2.  No evidence of pulmonary embolism. Electronically signed by:  Alfonso Ramirez M.D.  12/5/2019 9:32 PM                       Discharge Details        Discharge Medications      New Medications      Instructions Start Date   doxycycline 100 MG tablet  Commonly known as:  ADOXA   100 mg, Oral, Every 12 Hours Scheduled      oxyCODONE 5 MG immediate release tablet  Commonly known as:  ROXICODONE   5 mg, Oral, Every 4 Hours PRN             Allergies   Allergen Reactions   • Celecoxib Swelling     Hands and face          Discharge Disposition:  Home or Self Care    Diet:  Hospital:   regular      Discharge Activity:     As tolerated    CODE STATUS:    Code Status and Medical Interventions:   Ordered at: 12/06/19 0040     Code Status:    CPR     Medical Interventions (Level of Support Prior to Arrest):    Full         No future appointments.        Time spent on Discharge including face to face service:  30  minutes    Electronically signed by Jone Bell MD, 12/07/19, 2:04 PM.

## 2019-12-07 NOTE — ASSESSMENT & PLAN NOTE
4 X 3 cm size lung mass on the Right middle lob, anteriorly - in the CT scan  The appearance seems to be tumor  No fever, no cough - rack of Sx of pneumonia  Smoking HX over 25 years.    Pulmonology consultation requested.  Done by Dr. Meredith.  IR needle biopsy will be done at the [facility].  The patient was discharged..

## 2019-12-07 NOTE — CONSULTS
"Pulmonary/ Critical Care/ sleep medicine Consult Note        Patient Name:  Kendrick Medina    :  1975    Medical Record:  [id number]    Requesting Physician    No ref. provider found    Primary Care Physician     Provider, No Known    Reason for consultation    Kendrick Medina is a 44 y.o. male who was referred for consultation for lung mass.     44 y.o. male smoker 1 ppd since age 16 admitted with c/o chest pain right side of chest without radiation. Patient reports that on the AM of 19 he was suddenly awoken from sleep by \"sharp\" right sided chest pain with dyspnea that was constant during the day and worse with deep inspiration and movemenets and with laying flat on back. Patient denies any fever, chills, cough, Sputum, weight loss/weight gain, abdominal pain, N/V/D/C, or peripheral edema. Patient denies any recent trauma, hospitalizations, ABX use, or sick contacts. He reports that he is a [employer]  for work.      CT PE shows a 4 x 3.2 x 2.9 cm mass medially in the right middle lobe. Patient was given Rocephin, Doxycycline, zofran and morphine in the ED.        Review of Systems    Constitutional:  Denies fever or chills   Eyes:  Denies change in visual acuity   HENT:  Denies nasal congestion or sore throat   Respiratory:  + shortness of breath   Cardiovascular:  + chest pain   GI:  Denies abdominal pain, nausea, vomiting, bloody stools or diarrhea   :  Denies dysuria   Musculoskeletal:  Denies back pain or joint pain   Integument:  Denies rash   Neurologic:  Denies headache, focal weakness or sensory changes   Endocrine:  Denies polyuria or polydipsia   Lymphatic:  Denies swollen glands   Psychiatric:  Denies depression or anxiety     Medical History    Past Medical History:   Diagnosis Date   • History of degenerative disc disease         Surgical History    Past Surgical History:   Procedure Laterality Date   • CARPAL TUNNEL RELEASE WITH CUBITAL TUNNEL RELEASE          Family History  "   Family History   Problem Relation Age of Onset   • Asthma Sister        Social History    Social History     Tobacco Use   • Smoking status: Current Every Day Smoker     Packs/day: 1.00     Types: Cigarettes   • Smokeless tobacco: Never Used   Substance Use Topics   • Alcohol use: Yes     Alcohol/week: 2.0 standard drinks     Types: 2 Cans of beer per week     Frequency: Never     Comment: daily        Allergies    Allergies   Allergen Reactions   • Celecoxib Swelling     Hands and face        Medications    Scheduled Meds:  cefTRIAXone 1 g Intravenous Q24H   doxycycline 100 mg Oral Q12H   nicotine 1 patch Transdermal Q24H   sodium chloride 10 mL Intravenous Q12H     Continuous Infusions:   PRN Meds:.•  acetaminophen **OR** acetaminophen **OR** acetaminophen  •  bisacodyl  •  calcium carbonate  •  docusate sodium  •  HYDROmorphone  •  ipratropium-albuterol  •  magnesium hydroxide  •  melatonin  •  nitroglycerin  •  ondansetron **OR** ondansetron  •  oxyCODONE  •  sodium chloride  •  [COMPLETED] Insert peripheral IV **AND** sodium chloride  •  sodium chloride      Physical Exam    tMax 24 hrs:  Temp (24hrs), Av.2 °F (36.8 °C), Min:98.1 °F (36.7 °C), Max:98.4 °F (36.9 °C)    Vitals Ranges:  Temp:  [98.1 °F (36.7 °C)-98.4 °F (36.9 °C)] 98.1 °F (36.7 °C)  Heart Rate:  [66-90] 84  Resp:  [12-16] 12  BP: ()/(52-62) 104/62  Intake and Output Last 3 Shifts:  I/O last 3 completed shifts:  In: 120 [P.O.:120]  Out: -     Constitutional:  Well developed, well nourished, no acute distress, non-toxic appearance   Eyes:  PERRL, conjunctiva normal   HENT:  Atraumatic, external ears normal, nose normal, oropharynx moist, no pharyngeal exudates.   Neck- normal range of motion, no tenderness, supple   Respiratory:  No respiratory distress, normal breath sounds, no rales, no wheezing   Cardiovascular:  Normal rate, normal rhythm, no murmurs, no gallops, no rubs   GI:  Soft, nondistended, normal bowel sounds, nontender, no  organomegaly, no mass, no rebound, no guarding   :  No costovertebral angle tenderness   Musculoskeletal:  No edema, no tenderness, no deformities. Back- no tenderness  Integument:  Well hydrated, no rash   Lymphatic:  No lymphadenopathy noted   Neurologic:  Alert & oriented x 3, CN 2-12 normal, normal motor function, normal sensory function, no focal deficits noted   Psychiatric:  Speech and behavior appropriate     labs    Lab Results (last 24 hours)     Procedure Component Value Units Date/Time    Basic Metabolic Panel [[id number]]  (Abnormal) Collected:  12/07/19 0422    Specimen:  Blood Updated:  12/07/19 0507     Glucose 112 mg/dL      BUN 10 mg/dL      Creatinine 0.64 mg/dL      Sodium 139 mmol/L      Potassium 4.6 mmol/L      Chloride 104 mmol/L      CO2 24.0 mmol/L      Calcium 8.9 mg/dL      eGFR Non African Amer 135 mL/min/1.73      BUN/Creatinine Ratio 15.6     Anion Gap 11.0 mmol/L     Narrative:       GFR Normal >60  Chronic Kidney Disease <60  Kidney Failure <15      CBC & Differential [[id number]] Collected:  12/07/19 0422    Specimen:  Blood Updated:  12/07/19 0451    Narrative:       The following orders were created for panel order CBC & Differential.  Procedure                               Abnormality         Status                     ---------                               -----------         ------                     CBC Auto Differential[[id number]]        Normal              Final result                 Please view results for these tests on the individual orders.    CBC Auto Differential [[id number]]  (Normal) Collected:  12/07/19 0422    Specimen:  Blood Updated:  12/07/19 0451     WBC 7.10 10*3/mm3      RBC 4.29 10*6/mm3      Hemoglobin 14.0 g/dL      Hematocrit 40.6 %      MCV 94.5 fL      MCH 32.6 pg      MCHC 34.5 g/dL      RDW 12.7 %      RDW-SD 42.0 fl      MPV 7.5 fL      Platelets 180 10*3/mm3      Neutrophil % 60.2 %      Lymphocyte % 23.4 %      Monocyte % 10.7 %      Eosinophil  % 4.9 %      Basophil % 0.8 %      Neutrophils, Absolute 4.30 10*3/mm3      Lymphocytes, Absolute 1.70 10*3/mm3      Monocytes, Absolute 0.80 10*3/mm3      Eosinophils, Absolute 0.30 10*3/mm3      Basophils, Absolute 0.10 10*3/mm3      nRBC 0.2 /100 WBC     Troponin [[id number]]  (Normal) Collected:  12/06/19 1434    Specimen:  Blood Updated:  12/06/19 1533     Troponin T <0.010 ng/mL     Narrative:       Troponin T Reference Range:  <= 0.03 ng/mL-   Negative for AMI  >0.03 ng/mL-     Abnormal for myocardial necrosis.  Clinicians would have to utilize clinical acumen, EKG, Troponin and serial changes to determine if it is an Acute Myocardial Infarction or myocardial injury due to an underlying chronic condition.     Hemoglobin A1c [[id number]]  (Normal) Collected:  12/06/19 0858    Specimen:  Blood Updated:  12/06/19 1250     Hemoglobin A1C 4.9 %     Narrative:       Hemoglobin A1C Reference Range:    <5.7 %        Normal  5.7-6.4 %     Increased risk for diabetes  > 6.4 %        Diabetes       These guidelines have been recommended by the American Diabetic Association for Hgb A1c.      The following 2010 guidelines have been recommended by the American Diabetes Association for Hemoglobin A1c.    HBA1c 5.7-6.4% Increased risk for future diabetes (pre-diabetes)  HBA1c     >6.4% Diabetes    Lipid Panel [[id number]] Collected:  12/06/19 0858    Specimen:  Blood Updated:  12/06/19 1037     Total Cholesterol 160 mg/dL      Triglycerides 54 mg/dL      HDL Cholesterol 54 mg/dL      LDL Cholesterol  95 mg/dL      VLDL Cholesterol 10.8 mg/dL      LDL/HDL Ratio 1.76    Narrative:       Cholesterol Reference Ranges  (U.S. Department of Health and Human Services ATP III Classifications)    Desirable          <200 mg/dL  Borderline High    200-239 mg/dL  High Risk          >240 mg/dL      Triglyceride Reference Ranges  (U.S. Department of Health and Human Services ATP III Classifications)    Normal           <150 mg/dL  Borderline  High  150-199 mg/dL  High             200-499 mg/dL  Very High        >500 mg/dL    HDL Reference Ranges  (U.S. Department of Health and Human Services ATP III Classifcations)    Low     <40 mg/dl (major risk factor for CHD)  High    >60 mg/dl ('negative' risk factor for CHD)        LDL Reference Ranges  (U.S. Department of Health and Human Services ATP III Classifcations)    Optimal          <100 mg/dL  Near Optimal     100-129 mg/dL  Borderline High  130-159 mg/dL  High             160-189 mg/dL  Very High        >189 mg/dL          Imaging & Other Studies    Imaging Results (Last 72 Hours)     Procedure Component Value Units Date/Time    CT Chest Pulmonary Embolism [[id number]] Collected:  12/05/19 2129     Updated:  12/05/19 2333    Narrative:       EXAMINATION: CT ANGIOGRAM CHEST WITH IV CONTRAST       DATE OF EXAMINATION:  December 05, 2019    INDICATION: Elevated d-dimer and chest pain    COMPARISON: None available.    PROCEDURE: 72 ml of Isovue 370 contrast were administered intravenously during arterial phase axial CT chest imaging, with 2-D sagittal and coronal and 3-D MIP reformations.  CT dose lowering techniques were used, to include: automated exposure control,   adjustment for patient size, and or use of iterative reconstruction.    FINDINGS:    Cardiovascular: No pulmonary embolism is identified. Aorta and great vessels exhibit no aneurysm or dissection.    Mediastinum/Renata:  No mediastinal or hilar mass or significant lymphadenopathy identified.    Lungs/Pleura :  There is a 3.2 x 2.9 x 4 cm mass or area of consolidation in the right middle lobe.. No effusion, pleural mass, thickening or pneumothorax.    Chest wall and Axilla: Normal.    Bones:  Unremarkable.    Upper abdomen: Unremarkable.    Additional significant findings: None.        Impression:       1.  There is a 4 x 3.2 x 2.9 cm mass medially in the right middle lobe.  This could represent consolidated pneumonia or tumor.Lesion is 33  Hounsfield units in density, too high to be a simple pericardial cyst.  2.  No evidence of pulmonary embolism.    Electronically signed by:  Alfonso Ramirez M.D.    12/5/2019 9:32 PM    XR Chest 1 View [[id number]] Collected:  12/05/19 2242     Updated:  12/05/19 2244    Narrative:       XR CHEST 1 VW-     Date of Exam: 12/5/2019 10:10 PM     Indication: right sided cp     Comparison: None available.     Technique: 1 view(s) of the chest were obtained.     FINDINGS: The lungs are well expanded. Cardiac, hilar and  mediastinal silhouettes are normal. No significant pneumothorax, pleural  effusion or focal pulmonary parenchymal opacity. Pulmonary vascularity  is within normal limits. The trachea is midline. Visualized bony  structures are intact.       Impression:       No active cardiopulmonary disease.        Electronically Signed By-Billy George DO. On:12/5/2019 10:42 PM  This report was finalized on [id number] by  Billy George DO..          Assessment      Right-sided chest pain    Tobacco use    Mass of right lung      RML mass 4 cm, no adenopathy. 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 Proceed with CT guided biopsy.   Tobacco abuse- Smoking cessation recommended.     I thank you for this opportunity to take part in this patient's care and will follow the patient along with you.

## 2019-12-07 NOTE — PLAN OF CARE
Problem: Patient Care Overview  Goal: Plan of Care Review  Outcome: Ongoing (interventions implemented as appropriate)  Flowsheets (Taken 12/7/2019 0600)  Patient Agreement with Plan of Care: agrees

## 2019-12-07 NOTE — CONSULTS
"Nutrition Services    Patient Name:  Kendrick Medina  YOB: 1975  MRN: [id number]  Admit Date:  12/5/2019    Comments: Recommend diet advancement as medically able. RDN will continue to monitor for diet advancement and review po intakes, consider addition of ONS.    Reason for Assessment     Row Name           Reason for Assessment    Reason For Assessment Low BMI (12/7)       Diagnosis H&P: DDD, Tobacco Abuse    Current Problems: admit r/t chest pain    Chest pain, CT showed R lung mass (? Tumor) - plan for biopsy, tobacco abuse         Nutrition/Diet History     Row Name           Nutrition/Diet History    Typical Food/Fluid Intake 12/7: Unable to speak with patient, curtain pulled and another clinician in room     Functional Status  UTD       Food Allergies  No known food allergies per EMR       Factors Affecting Nutritional Intake   UTD         Anthropometrics             Anthropometrics    Height 66\"    Weight 113 lb (12/7/19)       Admit Weight    Admit Weight 110 lb (12/5/19)       Ideal Body Weight (IBW)    Ideal Body Weight (IBW) 142 lb    % Ideal Body Weight 80%       Usual Body Weight (UBW)    Usual Body Weight UTD    % Usual Body Weight UTD%    Weight Hx  No wt hx in EMR (no wt loss reported on MST)       Body Mass Index (BMI)    BMI (kg/m2) 18.29           Labs/Tests/Procedures/Meds                Labs    Pertinent Lab Results Comments  Na 139, K 4.6, Gluc 112, BUN 10, Crt 0.64 L, Ca 8.9, Hgb 14.0, Hct 40.6      12/6: A1C 4.9%        Medications    Pertinent Medications Comments  Rocephin         Physical Findings                Physical Findings    Overall Physical Appearance 12/7: UTD     Gastrointestinal No BM recorded at this time (x2 days)     Tubes No feeding tubes     Oral/Mouth Cavity UTD if concerns at this time     Skin Skin intact         Estimated/Assessed Needs              Calculation Measurements    Weight Used For Calculations      Height         KCAL/KG    KCAL/KG         "       Protein Requirements    Weight Used For Protein Calculations      Est Protein Requirement Amount (gms/kg)      Estimated Protein Requirements (gms/day)         Fluid Requirements    Estimated Fluid Requirements (mL/day)          Nutrition Prescription Ordered                Nutrition Prescription PO    Current PO Diet  NPO     Supplement  --     Supplement Frequency  --        Nutrition Prescription EN    Enteral Route  --     Product  --     TF Delivery Method  ---     Continuous TF Goal Rate (mL/hr)  --     Water flush (mL)   --     Water Flush Frequency  --        Nutrition Prescription PN    PN Route  --     PN Goal Volume (mL)  --     Dextrose (Kcal)  --     Amino Acid (gm)  --     Lipid Concentration (%)  --     Lipid Volume (mL)  --     Lipid Frequency  --         Evaluation of Received Nutrient/Fluid Intake                PO Evaluation    % PO Intake 75% x 1 meal (pt was previously on a Regular diet)        EN Evaluation    TF Changes  -- -     TF Residual  -- -     TF Tolerance  -- -        PN Evaluation    Number of Days PN Evaluated  -- -           Problem/Interventions:  Problem 1                Nutrition Diagnoses Problem 1    Problem 1 Inadequate oral intake RT decreased ability to consume sufficient energy needs AEB estimates of insufficient po intake r/t diet order (NPO)           Intervention Goal                Intervention Goal    General Advance diet    PO intake >75%         Nutrition Intervention                Nutrition Intervention    RD/Tech Action Will continue to monitor for diet advancement and review po intakes, consider addition of ONS         Nutrition Prescription     Row Name           Nutrition Prescription PO    Supplement None at this time     Supplement Frequency None at this time        Nutrition Prescription EN    Enteral Prescription  -- -        Nutrition Prescription PN    Parenteral Prescription  -- -         Education/Evaluation                Monitor/Evaluation     Monitor  Diet advancement, po intake, weight, labs, meds, skin, and BMs             Electronically signed by:  Leona Villalta RD  12/07/19 10:58 AM

## 2019-12-07 NOTE — NURSING NOTE
Dr. Bell on unit to see patient. This nurse spoke with MD in regards to chronic neck pain and right upper chest pain. Verbal orders for Percocet 5mg Q4 hours PRN and Dilaudid IV 0.25mg Q 4 hours PRN for pain. Ordered repeated to MD for accuracy and ordered placed. Will monitor patient for worsening pain.

## 2019-12-12 ENCOUNTER — HOSPITAL ENCOUNTER (OUTPATIENT)
Dept: GENERAL RADIOLOGY | Facility: HOSPITAL | Age: 44
Discharge: HOME | End: 2019-12-12

## 2019-12-12 ENCOUNTER — HOSPITAL ENCOUNTER (OUTPATIENT)
Dept: CT IMAGING | Facility: HOSPITAL | Age: 44
Discharge: HOME | End: 2019-12-12
Admitting: RADIOLOGY

## 2019-12-12 VITALS
HEART RATE: 75 BPM | DIASTOLIC BLOOD PRESSURE: 58 MMHG | RESPIRATION RATE: 18 BRPM | OXYGEN SATURATION: 97 % | WEIGHT: 115.52 LBS | SYSTOLIC BLOOD PRESSURE: 93 MMHG | HEIGHT: 66 IN | BODY MASS INDEX: 18.57 KG/M2 | TEMPERATURE: 98.9 F

## 2019-12-12 DIAGNOSIS — R91.8 MASS OF MIDDLE LOBE OF RIGHT LUNG: ICD-10-CM

## 2019-12-12 LAB
APTT PPP: 26.3 SECONDS (ref 24–31)
BASOPHILS # BLD AUTO: 0.1 10*3/MM3 (ref 0–0.2)
BASOPHILS NFR BLD AUTO: 0.6 % (ref 0–1.5)
DEPRECATED RDW RBC AUTO: 40.7 FL (ref 37–54)
EOSINOPHIL # BLD AUTO: 0.3 10*3/MM3 (ref 0–0.4)
EOSINOPHIL NFR BLD AUTO: 2.6 % (ref 0.3–6.2)
ERYTHROCYTE [DISTWIDTH] IN BLOOD BY AUTOMATED COUNT: 12.3 % (ref 12.3–15.4)
HCT VFR BLD AUTO: 42.1 % (ref 37.5–51)
HGB BLD-MCNC: 14.5 G/DL (ref 13–17.7)
INR PPP: 1.01 (ref 0.9–1.1)
LYMPHOCYTES # BLD AUTO: 1.3 10*3/MM3 (ref 0.7–3.1)
LYMPHOCYTES NFR BLD AUTO: 11.7 % (ref 19.6–45.3)
MCH RBC QN AUTO: 32.3 PG (ref 26.6–33)
MCHC RBC AUTO-ENTMCNC: 34.4 G/DL (ref 31.5–35.7)
MCV RBC AUTO: 93.8 FL (ref 79–97)
MONOCYTES # BLD AUTO: 1.1 10*3/MM3 (ref 0.1–0.9)
MONOCYTES NFR BLD AUTO: 9.8 % (ref 5–12)
NEUTROPHILS # BLD AUTO: 8.3 10*3/MM3 (ref 1.7–7)
NEUTROPHILS NFR BLD AUTO: 75.3 % (ref 42.7–76)
NRBC BLD AUTO-RTO: 0.1 /100 WBC (ref 0–0.2)
PLATELET # BLD AUTO: 296 10*3/MM3 (ref 140–450)
PMV BLD AUTO: 7.1 FL (ref 6–12)
PROTHROMBIN TIME: 10.6 SECONDS (ref 9.6–11.7)
RBC # BLD AUTO: 4.49 10*6/MM3 (ref 4.14–5.8)
WBC NRBC COR # BLD: 11 10*3/MM3 (ref 3.4–10.8)

## 2019-12-12 PROCEDURE — 85610 PROTHROMBIN TIME: CPT | Performed by: RADIOLOGY

## 2019-12-12 PROCEDURE — 87015 SPECIMEN INFECT AGNT CONCNTJ: CPT | Performed by: INTERNAL MEDICINE

## 2019-12-12 PROCEDURE — 88312 SPECIAL STAINS GROUP 1: CPT | Performed by: INTERNAL MEDICINE

## 2019-12-12 PROCEDURE — 71045 X-RAY EXAM CHEST 1 VIEW: CPT

## 2019-12-12 PROCEDURE — 25010000002 MIDAZOLAM PER 1 MG: Performed by: RADIOLOGY

## 2019-12-12 PROCEDURE — 85730 THROMBOPLASTIN TIME PARTIAL: CPT | Performed by: RADIOLOGY

## 2019-12-12 PROCEDURE — 88334 PATH CONSLTJ SURG CYTO XM EA: CPT | Performed by: INTERNAL MEDICINE

## 2019-12-12 PROCEDURE — 87070 CULTURE OTHR SPECIMN AEROBIC: CPT | Performed by: INTERNAL MEDICINE

## 2019-12-12 PROCEDURE — 88333 PATH CONSLTJ SURG CYTO XM 1: CPT | Performed by: INTERNAL MEDICINE

## 2019-12-12 PROCEDURE — 77012 CT SCAN FOR NEEDLE BIOPSY: CPT

## 2019-12-12 PROCEDURE — 99153 MOD SED SAME PHYS/QHP EA: CPT

## 2019-12-12 PROCEDURE — 87205 SMEAR GRAM STAIN: CPT | Performed by: INTERNAL MEDICINE

## 2019-12-12 PROCEDURE — 88305 TISSUE EXAM BY PATHOLOGIST: CPT | Performed by: INTERNAL MEDICINE

## 2019-12-12 PROCEDURE — 25010000002 ONDANSETRON PER 1 MG: Performed by: RADIOLOGY

## 2019-12-12 PROCEDURE — 25010000002 FENTANYL CITRATE (PF) 100 MCG/2ML SOLUTION: Performed by: RADIOLOGY

## 2019-12-12 PROCEDURE — 85025 COMPLETE CBC W/AUTO DIFF WBC: CPT | Performed by: RADIOLOGY

## 2019-12-12 PROCEDURE — 99152 MOD SED SAME PHYS/QHP 5/>YRS: CPT

## 2019-12-12 RX ORDER — MIDAZOLAM HYDROCHLORIDE 1 MG/ML
INJECTION INTRAMUSCULAR; INTRAVENOUS
Status: COMPLETED | OUTPATIENT
Start: 2019-12-12 | End: 2019-12-12

## 2019-12-12 RX ORDER — ONDANSETRON 2 MG/ML
INJECTION INTRAMUSCULAR; INTRAVENOUS
Status: COMPLETED | OUTPATIENT
Start: 2019-12-12 | End: 2019-12-12

## 2019-12-12 RX ORDER — SODIUM CHLORIDE 0.9 % (FLUSH) 0.9 %
3 SYRINGE (ML) INJECTION EVERY 12 HOURS SCHEDULED
Status: DISCONTINUED | OUTPATIENT
Start: 2019-12-12 | End: 2019-12-12 | Stop reason: HOSPADM

## 2019-12-12 RX ORDER — FENTANYL CITRATE 50 UG/ML
INJECTION, SOLUTION INTRAMUSCULAR; INTRAVENOUS
Status: COMPLETED | OUTPATIENT
Start: 2019-12-12 | End: 2019-12-12

## 2019-12-12 RX ORDER — ACETAMINOPHEN 325 MG/1
650 TABLET ORAL EVERY 4 HOURS PRN
Status: DISCONTINUED | OUTPATIENT
Start: 2019-12-12 | End: 2019-12-13 | Stop reason: HOSPADM

## 2019-12-12 RX ORDER — SODIUM CHLORIDE 0.9 % (FLUSH) 0.9 %
3-10 SYRINGE (ML) INJECTION AS NEEDED
Status: DISCONTINUED | OUTPATIENT
Start: 2019-12-12 | End: 2019-12-12 | Stop reason: HOSPADM

## 2019-12-12 RX ADMIN — MIDAZOLAM 0.5 MG: 1 INJECTION INTRAMUSCULAR; INTRAVENOUS at 09:58

## 2019-12-12 RX ADMIN — FENTANYL CITRATE 100 MCG: 50 INJECTION, SOLUTION INTRAMUSCULAR; INTRAVENOUS at 09:55

## 2019-12-12 RX ADMIN — ONDANSETRON 4 MG: 2 INJECTION INTRAMUSCULAR; INTRAVENOUS at 09:11

## 2019-12-12 RX ADMIN — MIDAZOLAM 1 MG: 1 INJECTION INTRAMUSCULAR; INTRAVENOUS at 09:55

## 2019-12-12 NOTE — DISCHARGE INSTRUCTIONS
A responsible adult should stay with you and you should rest quietly for the rest of the day. Do not drink alcohol, drive or cook for 24 hours following your procedure.  Progress your diet as tolerated.  Resume your usual medications including aspirin.  When you remove your dressing in 48 hours, a small amount of blood is to be expected. Do not be alarmed.  If you feel it is bleeding excessively apply pressure and proceed to the Emergency room.  Do not shower, bath, or get your dressing wet at all for 48 hours.  You may shower after the dressing is removed. No lifting more that 10 pounds for 48 hours.  If severe pain, increased shortness of air or racing heartbeat occur, seek immediate medical attention.  Follow up with Dr. Meredith for results.

## 2019-12-12 NOTE — NURSING NOTE
Pt moved self from stretcher onto CT exam table in supine position, head first into the scanner. Pt remains on heart, blood pressure, and oxygen sat monitoring. Pt placed on 2L oxygen via N/C.

## 2019-12-12 NOTE — NURSING NOTE
Pt transferred back to RN in IR pre/post, in stable condition on room air. Dressing to right upper chest is dry and intact.

## 2019-12-12 NOTE — NURSING NOTE
Dr. Saeed is obtaining aspirate sample for pathologist and has requested that body fluid culture order be placed for Dr. Meredith.

## 2019-12-12 NOTE — NURSING NOTE
Pt moved self back onto his stretcher in semi-fowlers position. Pt is back on room air and remains on heart, blood pressure, and oxygen sat monitoring. Pt is goinig to xray dept for post procedure CXR.

## 2019-12-12 NOTE — NURSING NOTE
Right upper chest prepped in sterile fashion using chlorhexidine scrub and then draped in sterile fashion.

## 2019-12-13 LAB
LAB AP CASE REPORT: NORMAL
LAB AP DIAGNOSIS COMMENT: NORMAL
LAB AP INTRADEPARTMENTAL CONSULT: NORMAL
Lab: NORMAL
PATH REPORT.FINAL DX SPEC: NORMAL
PATH REPORT.GROSS SPEC: NORMAL

## 2019-12-15 LAB
BACTERIA FLD CULT: NORMAL
GRAM STN SPEC: NORMAL
GRAM STN SPEC: NORMAL

## 2019-12-18 ENCOUNTER — LAB (OUTPATIENT)
Dept: LAB | Facility: HOSPITAL | Age: 44
End: 2019-12-18

## 2019-12-18 ENCOUNTER — OFFICE VISIT (OUTPATIENT)
Dept: PULMONOLOGY | Facility: HOSPITAL | Age: 44
End: 2019-12-18

## 2019-12-18 VITALS
HEART RATE: 82 BPM | HEIGHT: 66 IN | OXYGEN SATURATION: 99 % | WEIGHT: 111.4 LBS | BODY MASS INDEX: 17.9 KG/M2 | DIASTOLIC BLOOD PRESSURE: 66 MMHG | SYSTOLIC BLOOD PRESSURE: 102 MMHG

## 2019-12-18 DIAGNOSIS — R07.9 RIGHT-SIDED CHEST PAIN: ICD-10-CM

## 2019-12-18 DIAGNOSIS — R91.8 MASS OF RIGHT LUNG: ICD-10-CM

## 2019-12-18 DIAGNOSIS — Z72.0 TOBACCO USE: Chronic | ICD-10-CM

## 2019-12-18 DIAGNOSIS — B39.4 HISTOPLASMA CAPSULATUM INFECTION: Primary | ICD-10-CM

## 2019-12-18 DIAGNOSIS — B39.4 HISTOPLASMA CAPSULATUM INFECTION: ICD-10-CM

## 2019-12-18 LAB
ALBUMIN SERPL-MCNC: 3.7 G/DL (ref 3.5–5.2)
ALP SERPL-CCNC: 63 U/L (ref 39–117)
ALT SERPL W P-5'-P-CCNC: 14 U/L (ref 1–41)
AST SERPL-CCNC: 15 U/L (ref 1–40)
BILIRUB CONJ SERPL-MCNC: <0.2 MG/DL (ref 0.2–0.3)
BILIRUB INDIRECT SERPL-MCNC: ABNORMAL MG/DL
BILIRUB SERPL-MCNC: 0.2 MG/DL (ref 0.2–1.2)
PROT SERPL-MCNC: 6.8 G/DL (ref 6–8.5)

## 2019-12-18 PROCEDURE — G0463 HOSPITAL OUTPT CLINIC VISIT: HCPCS

## 2019-12-18 PROCEDURE — 80076 HEPATIC FUNCTION PANEL: CPT

## 2019-12-18 PROCEDURE — 36415 COLL VENOUS BLD VENIPUNCTURE: CPT

## 2019-12-18 NOTE — ASSESSMENT & PLAN NOTE
RML mass, s/p biopsy showing Histoplasma. Positive symptoms including SOA, chest pain, night sweats. Will start Itraconazole. Check LFT. Script given.  F/u in 6 weeks.

## 2019-12-18 NOTE — PROGRESS NOTES
12/18/2019     Kendrick AIDAN Adam  1975      Chief Complaint   Patient presents with   • Hospital follow-up   • Results     CT-guided bx   • Lung Mass         Subjective   43 y/o male with R lung mass, s/p biopsy.       Review of System  General: Denies fevers or chills.  + night sweats   HEENT: Denies sore throat, rhinorrhea, ear pain.  Denies any change in vision.   LUNGS: + shortness of breath, cough.  Denies any hemoptysis.  CARDIAC: + chest pain, palpitations. Denies edema  ABD: Denies any abdominal pain.  Denies any N/V/D  PSYCH: Negative for suicidal ideas. Denies anxiety or depression  All other systems reviewed and negative unless stated in HPI       Objective    Vitals:    12/18/19 0853   BP: 102/66   Pulse: 82   SpO2: 99%       General: NAD, alert and oriented x 4  Cardiac: S1, S2, RRR, no murmur, no edema  Pulmonary: CTA bilaterally, no wheezing   Abdomen: soft, non-tender, non-distended  : Voids independently, no CVA tenderness   Musculoskeletal: Moves all extremities, equal strength bilaterally  Neuro: alert and oriented x 3, CN 2 - 12 grossly intact  Skin: warm, dry, and intact        No current outpatient medications on file.    Social History     Socioeconomic History   • Marital status: Single     Spouse name: Not on file   • Number of children: Not on file   • Years of education: Not on file   • Highest education level: Not on file   Tobacco Use   • Smoking status: Current Every Day Smoker     Packs/day: 1.00     Years: 22.00     Pack years: 22.00     Types: Cigarettes   • Smokeless tobacco: Never Used   Substance and Sexual Activity   • Alcohol use: Yes     Alcohol/week: 2.0 standard drinks     Types: 2 Cans of beer per week     Frequency: Never     Comment: daily   • Drug use: No   • Sexual activity: Defer       Past Medical History:   Diagnosis Date   • History of degenerative disc disease    • Lung mass                Diagnoses and all orders for this visit:    1. Histoplasma capsulatum  infection (Primary)  Assessment & Plan:  RML mass, s/p biopsy showing Histoplasma. Positive symptoms including SOA, chest pain, night sweats. Will start Itraconazole. Check LFT. Script given.  F/u in 6 weeks.       2. Tobacco use  Assessment & Plan:  Cessation discussed.       3. Mass of right lung    4. Right-sided chest pain            Narayan Meredith MD

## 2020-01-29 ENCOUNTER — HOSPITAL ENCOUNTER (OUTPATIENT)
Dept: CT IMAGING | Facility: HOSPITAL | Age: 45
Discharge: HOME | End: 2020-01-29
Admitting: INTERNAL MEDICINE

## 2020-01-29 ENCOUNTER — OFFICE VISIT (OUTPATIENT)
Dept: PULMONOLOGY | Facility: HOSPITAL | Age: 45
End: 2020-01-29

## 2020-01-29 VITALS
HEART RATE: 62 BPM | WEIGHT: 114.2 LBS | HEIGHT: 66 IN | DIASTOLIC BLOOD PRESSURE: 81 MMHG | SYSTOLIC BLOOD PRESSURE: 124 MMHG | BODY MASS INDEX: 18.35 KG/M2 | OXYGEN SATURATION: 99 %

## 2020-01-29 DIAGNOSIS — Z72.0 TOBACCO USE: Chronic | ICD-10-CM

## 2020-01-29 DIAGNOSIS — R91.8 MASS OF RIGHT LUNG: ICD-10-CM

## 2020-01-29 DIAGNOSIS — B39.4 HISTOPLASMA CAPSULATUM INFECTION: Primary | ICD-10-CM

## 2020-01-29 PROCEDURE — 71250 CT THORAX DX C-: CPT

## 2020-01-29 PROCEDURE — G0463 HOSPITAL OUTPT CLINIC VISIT: HCPCS

## 2020-01-29 RX ORDER — ITRACONAZOLE 100 MG/1
CAPSULE ORAL
COMMUNITY
Start: 2019-12-19 | End: 2020-02-11 | Stop reason: DRUGHIGH

## 2020-01-29 NOTE — PROGRESS NOTES
1/29/2020     Kendrick Medina  1975      Chief Complaint   Patient presents with   • Follow-up     6 week   • Histoplasmosis         Subjective   45 y/o male here for follow up RML mass, s/p biopsy showing Histoplasma. Symptoms including SOA, chest pain, night sweats are now resolved after 6 weeks of Itraconazole.     Review of System  General: Denies fevers or chills.  Denies any weakness or fatigue.  Denies any weight loss or weight gain.  HEENT: Denies sore throat, rhinorrhea, ear pain.  Denies any change in vision.   LUNGS: Denies any shortness of breath or wheezing.  Denies any cough.  Denies any hemoptysis.  CARDIAC: Denies any chest pain, palpitations. Denies edema  ABD: Denies any abdominal pain.  Denies any N/V/D  PSYCH: Negative for suicidal ideas. Denies anxiety or depression  All other systems reviewed and negative unless stated in HPI       Objective    Vitals:    01/29/20 0948   BP: 124/81   Pulse: 62   SpO2: 99%       General: NAD, alert and oriented x 4  Cardiac: S1, S2, RRR, no murmur, no edema  Pulmonary: CTA bilaterally, no wheezing   Abdomen: soft, non-tender, non-distended  : Voids independently, no CVA tenderness   Musculoskeletal: Moves all extremities, equal strength bilaterally  Neuro: alert and oriented x 3, CN 2 - 12 grossly intact  Skin: warm, dry, and intact          Current Outpatient Medications:   •  itraconazole (SPORANOX) 100 MG capsule, TAKE 2 TABLETS BY MOUTH 3 TIMES A DAY FOR 3 DAYS FOLLOWED BY 2 TABLETS ONCE DAILY FOR 6 WEEKS, Disp: , Rfl:     Social History     Socioeconomic History   • Marital status: Single     Spouse name: Not on file   • Number of children: Not on file   • Years of education: Not on file   • Highest education level: Not on file   Tobacco Use   • Smoking status: Current Every Day Smoker     Packs/day: 1.00     Years: 22.00     Pack years: 22.00     Types: Cigarettes   • Smokeless tobacco: Never Used   Substance and Sexual Activity   • Alcohol use: Yes      Alcohol/week: 2.0 standard drinks     Types: 2 Cans of beer per week     Frequency: Never     Comment: daily   • Drug use: No   • Sexual activity: Defer       Past Medical History:   Diagnosis Date   • History of degenerative disc disease    • Lung mass                Diagnoses and all orders for this visit:    1. Histoplasma capsulatum infection (Primary)- Acute Pulmonary-   12/18/19 RML mass, s/p biopsy showing Histoplasma. Positive symptoms including SOA, chest pain, night sweats. Will start Itraconazole. Check LFT. Script given.  F/u in 6 weeks.   1/29/20 Feels much better, on Itraconazole tx 6th week today. LFT ok. Will repeat CT chest and if mass resolved or significantly reduced in size, will stop Itraconazole.      2. Tobacco use  - Smoking cessation discussed.     3. Mass of right lung    12/5/19 CT chest: There is a 3.2 x 2.9 x 4 cm mass or area of consolidation in the right middle lobe.. No effusion, pleural mass, thickening or pneumothorax.  S/p CT guided biopsy showing Histoplasma. Repeat CT chest after 6 weeks of treatment.     High complexity     Narayan Meredith MD

## 2020-02-10 ENCOUNTER — TELEPHONE (OUTPATIENT)
Dept: PULMONOLOGY | Facility: HOSPITAL | Age: 45
End: 2020-02-10

## 2020-02-10 NOTE — TELEPHONE ENCOUNTER
Patient is calling asking if he can have something other than the itraconazole, due to cost. If there is nothing else he cacn take than he does need a refill sent into the pharmacy. They are also wanting the results of his CT scan. He does state he has been out of the medicine for the last week but he is now having some discomfort in his chest again. Please advise.

## 2020-02-11 RX ORDER — ITRACONAZOLE 100 MG/1
200 CAPSULE ORAL DAILY
Qty: 28 CAPSULE | Refills: 0 | Status: SHIPPED | OUTPATIENT
Start: 2020-02-11 | End: 2020-02-25

## 2020-02-11 NOTE — TELEPHONE ENCOUNTER
Spoke with [facility] re: formulary for patient.  Fluconazole 100 mg is preferred.  Cost is $72.85/30-day supply or $164.24/90-day supply.  Call reference # [id number].

## 2020-02-11 NOTE — TELEPHONE ENCOUNTER
Per Dr. 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  The only other alternative is a medication called Bosaconazole.  Patient needs to continue med for at least 2 more weeks, then see Dr. Meredith.  He still has a lesion, per recent CT chest.  I spoke with Karen (pt's girlfriend).  Relayed all above info.  She says he will continue Itraconazole x 2 weeks - needs script sent to pharmacy.

## 2020-02-19 ENCOUNTER — OFFICE VISIT (OUTPATIENT)
Dept: PULMONOLOGY | Facility: HOSPITAL | Age: 45
End: 2020-02-19

## 2020-02-19 VITALS
WEIGHT: 112 LBS | OXYGEN SATURATION: 98 % | DIASTOLIC BLOOD PRESSURE: 68 MMHG | HEART RATE: 73 BPM | SYSTOLIC BLOOD PRESSURE: 112 MMHG | BODY MASS INDEX: 18 KG/M2 | HEIGHT: 66 IN

## 2020-02-19 DIAGNOSIS — B39.4 HISTOPLASMA CAPSULATUM INFECTION: ICD-10-CM

## 2020-02-19 DIAGNOSIS — R07.9 RIGHT-SIDED CHEST PAIN: ICD-10-CM

## 2020-02-19 DIAGNOSIS — R91.8 MASS OF RIGHT LUNG: Primary | ICD-10-CM

## 2020-02-19 PROCEDURE — G0463 HOSPITAL OUTPT CLINIC VISIT: HCPCS

## 2020-02-19 NOTE — PROGRESS NOTES
2/19/2020     Kendrick AIDAN Adam  1975      Chief Complaint   Patient presents with   • Lung Nodule     4 Week F/U         Subjective   43 y/o male here for follow up RML mass, s/p biopsy showing Histoplasma. Chest pain recurred after 6 weeks of Itraconazole. So he is back on treatment, chest pain is minimal and improving as per patient.        Review of System  General: Denies fevers or chills.  Denies any weakness or fatigue.  Denies any weight loss or weight gain.  HEENT: Denies sore throat, rhinorrhea, ear pain.  Denies any change in vision.   LUNGS: Denies any shortness of breath or wheezing.  Denies any cough.  Denies any hemoptysis.  CARDIAC: Denies any chest pain, palpitations. Denies edema  ABD: Denies any abdominal pain.  Denies any N/V/D  PSYCH: Negative for suicidal ideas. Denies anxiety or depression  All other systems reviewed and negative unless stated in HPI       Objective    Vitals:    02/19/20 0933   BP: 112/68   Pulse: 73   SpO2: 98%       General: NAD, alert and oriented x 4  Cardiac: S1, S2, RRR, no murmur, no edema  Pulmonary: CTA bilaterally, no wheezing   Abdomen: soft, non-tender, non-distended  : Voids independently, no CVA tenderness   Musculoskeletal: Moves all extremities, equal strength bilaterally  Neuro: alert and oriented x 3, CN 2 - 12 grossly intact  Skin: warm, dry, and intact          Current Outpatient Medications:   •  itraconazole (SPORANOX) 100 MG capsule, Take 2 capsules by mouth Daily for 14 days., Disp: 28 capsule, Rfl: 0    Social History     Socioeconomic History   • Marital status: Single     Spouse name: Not on file   • Number of children: Not on file   • Years of education: Not on file   • Highest education level: Not on file   Tobacco Use   • Smoking status: Current Every Day Smoker     Packs/day: 1.00     Years: 22.00     Pack years: 22.00     Types: Cigarettes   • Smokeless tobacco: Never Used   Substance and Sexual Activity   • Alcohol use: Yes      Alcohol/week: 2.0 standard drinks     Types: 2 Cans of beer per week     Frequency: Never     Comment: daily   • Drug use: No   • Sexual activity: Defer       Past Medical History:   Diagnosis Date   • History of degenerative disc disease    • Lung mass                Diagnoses and all orders for this visit:    1. Mass of right lung (Primary)    2. Right-sided chest pain    3. Histoplasma capsulatum infection    .Histoplasma capsulatum infection (Primary)- Acute Pulmonary-   12/18/19 RML mass, s/p biopsy showing Histoplasma. Positive symptoms including SOA, chest pain, night sweats. Will start Itraconazole. Check LFT. Script given.  F/u in 6 weeks.   1/29/20 Feels much better, on Itraconazole tx 6th week today. LFT ok. Will repeat CT chest and if mass resolved or significantly reduced in size, will stop Itraconazole.   2/19/20 After stopping Itraconazole chest pain recurred and he is back on Itraconazole for another 2 weeks. Feels better. Recommended duration is 6 to 12 weeks. Patient will take a total of 8 weeks provided that his symptoms resolved. Otherwise will continue treatment.      Tobacco use  - Smoking cessation discussed.      Mass of right lung     12/5/19 CT chest: There is a 3.2 x 2.9 x 4 cm mass or area of consolidation in the right middle lobe.. No effusion, pleural mass, thickening or pneumothorax.  S/p CT guided biopsy showing Histoplasma. Repeat CT chest after 6 weeks of treatment.   2/19/20 Repeat CT chest showed a significant decrease in size of RML density.            Narayan Meredith MD

## 2021-06-08 PROCEDURE — U0003 INFECTIOUS AGENT DETECTION BY NUCLEIC ACID (DNA OR RNA); SEVERE ACUTE RESPIRATORY SYNDROME CORONAVIRUS 2 (SARS-COV-2) (CORONAVIRUS DISEASE [COVID-19]), AMPLIFIED PROBE TECHNIQUE, MAKING USE OF HIGH THROUGHPUT TECHNOLOGIES AS DESCRIBED BY CMS-2020-01-R: HCPCS | Performed by: NURSE PRACTITIONER

## 2021-06-14 ENCOUNTER — TELEPHONE (OUTPATIENT)
Dept: URGENT CARE | Facility: CLINIC | Age: 46
End: 2021-06-14

## 2022-04-14 ENCOUNTER — APPOINTMENT (OUTPATIENT)
Dept: GENERAL RADIOLOGY | Facility: HOSPITAL | Age: 47
End: 2022-04-14

## 2022-04-14 ENCOUNTER — HOSPITAL ENCOUNTER (EMERGENCY)
Facility: HOSPITAL | Age: 47
Discharge: HOME | End: 2022-04-14
Attending: EMERGENCY MEDICINE | Admitting: EMERGENCY MEDICINE

## 2022-04-14 VITALS
SYSTOLIC BLOOD PRESSURE: 106 MMHG | OXYGEN SATURATION: 99 % | RESPIRATION RATE: 16 BRPM | DIASTOLIC BLOOD PRESSURE: 72 MMHG | HEART RATE: 80 BPM | BODY MASS INDEX: 20.05 KG/M2 | WEIGHT: 124.78 LBS | HEIGHT: 66 IN | TEMPERATURE: 98.5 F

## 2022-04-14 DIAGNOSIS — F10.10 ALCOHOL ABUSE: ICD-10-CM

## 2022-04-14 DIAGNOSIS — R07.9 CHEST PAIN, UNSPECIFIED TYPE: Primary | ICD-10-CM

## 2022-04-14 DIAGNOSIS — F41.9 ANXIETY: ICD-10-CM

## 2022-04-14 LAB
ALBUMIN SERPL-MCNC: 4.4 G/DL (ref 3.5–5.2)
ALBUMIN/GLOB SERPL: 2.1 G/DL
ALP SERPL-CCNC: 60 U/L (ref 39–117)
ALT SERPL W P-5'-P-CCNC: 13 U/L (ref 1–41)
AMPHET+METHAMPHET UR QL: NEGATIVE
ANION GAP SERPL CALCULATED.3IONS-SCNC: 13 MMOL/L (ref 5–15)
AST SERPL-CCNC: 25 U/L (ref 1–40)
BARBITURATES UR QL SCN: NEGATIVE
BASOPHILS # BLD AUTO: 0.1 10*3/MM3 (ref 0–0.2)
BASOPHILS NFR BLD AUTO: 0.9 % (ref 0–1.5)
BENZODIAZ UR QL SCN: NEGATIVE
BILIRUB SERPL-MCNC: 0.7 MG/DL (ref 0–1.2)
BUN SERPL-MCNC: 8 MG/DL (ref 6–20)
BUN/CREAT SERPL: 11 (ref 7–25)
CALCIUM SPEC-SCNC: 9.4 MG/DL (ref 8.6–10.5)
CANNABINOIDS SERPL QL: NEGATIVE
CHLORIDE SERPL-SCNC: 102 MMOL/L (ref 98–107)
CO2 SERPL-SCNC: 21 MMOL/L (ref 22–29)
COCAINE UR QL: NEGATIVE
CREAT SERPL-MCNC: 0.73 MG/DL (ref 0.76–1.27)
D DIMER PPP FEU-MCNC: 0.28 MG/L (FEU) (ref 0–0.59)
DEPRECATED RDW RBC AUTO: 42 FL (ref 37–54)
EGFRCR SERPLBLD CKD-EPI 2021: 112.2 ML/MIN/1.73
EOSINOPHIL # BLD AUTO: 0.1 10*3/MM3 (ref 0–0.4)
EOSINOPHIL NFR BLD AUTO: 1 % (ref 0.3–6.2)
ERYTHROCYTE [DISTWIDTH] IN BLOOD BY AUTOMATED COUNT: 12.8 % (ref 12.3–15.4)
ETHANOL UR QL: <0.01 %
GLOBULIN UR ELPH-MCNC: 2.1 GM/DL
GLUCOSE SERPL-MCNC: 94 MG/DL (ref 65–99)
HCT VFR BLD AUTO: 41.6 % (ref 37.5–51)
HGB BLD-MCNC: 14.7 G/DL (ref 13–17.7)
LYMPHOCYTES # BLD AUTO: 1.3 10*3/MM3 (ref 0.7–3.1)
LYMPHOCYTES NFR BLD AUTO: 14.6 % (ref 19.6–45.3)
MCH RBC QN AUTO: 33.7 PG (ref 26.6–33)
MCHC RBC AUTO-ENTMCNC: 35.4 G/DL (ref 31.5–35.7)
MCV RBC AUTO: 95.1 FL (ref 79–97)
METHADONE UR QL SCN: NEGATIVE
MONOCYTES # BLD AUTO: 0.8 10*3/MM3 (ref 0.1–0.9)
MONOCYTES NFR BLD AUTO: 8.2 % (ref 5–12)
NEUTROPHILS NFR BLD AUTO: 6.9 10*3/MM3 (ref 1.7–7)
NEUTROPHILS NFR BLD AUTO: 75.3 % (ref 42.7–76)
NRBC BLD AUTO-RTO: 0 /100 WBC (ref 0–0.2)
OPIATES UR QL: NEGATIVE
OXYCODONE UR QL SCN: NEGATIVE
PLATELET # BLD AUTO: 185 10*3/MM3 (ref 140–450)
PMV BLD AUTO: 8 FL (ref 6–12)
POTASSIUM SERPL-SCNC: 4.1 MMOL/L (ref 3.5–5.2)
PROT SERPL-MCNC: 6.5 G/DL (ref 6–8.5)
RBC # BLD AUTO: 4.37 10*6/MM3 (ref 4.14–5.8)
SODIUM SERPL-SCNC: 136 MMOL/L (ref 136–145)
TROPONIN T SERPL-MCNC: <0.01 NG/ML (ref 0–0.03)
TROPONIN T SERPL-MCNC: <0.01 NG/ML (ref 0–0.03)
WBC NRBC COR # BLD: 9.2 10*3/MM3 (ref 3.4–10.8)

## 2022-04-14 PROCEDURE — 80307 DRUG TEST PRSMV CHEM ANLYZR: CPT | Performed by: PHYSICIAN ASSISTANT

## 2022-04-14 PROCEDURE — 25010000002 LORAZEPAM PER 2 MG: Performed by: PHYSICIAN ASSISTANT

## 2022-04-14 PROCEDURE — 71045 X-RAY EXAM CHEST 1 VIEW: CPT

## 2022-04-14 PROCEDURE — 93005 ELECTROCARDIOGRAM TRACING: CPT

## 2022-04-14 PROCEDURE — 99283 EMERGENCY DEPT VISIT LOW MDM: CPT

## 2022-04-14 PROCEDURE — 80053 COMPREHEN METABOLIC PANEL: CPT | Performed by: PHYSICIAN ASSISTANT

## 2022-04-14 PROCEDURE — 85025 COMPLETE CBC W/AUTO DIFF WBC: CPT | Performed by: PHYSICIAN ASSISTANT

## 2022-04-14 PROCEDURE — 85379 FIBRIN DEGRADATION QUANT: CPT | Performed by: PHYSICIAN ASSISTANT

## 2022-04-14 PROCEDURE — 93005 ELECTROCARDIOGRAM TRACING: CPT | Performed by: EMERGENCY MEDICINE

## 2022-04-14 PROCEDURE — 84484 ASSAY OF TROPONIN QUANT: CPT | Performed by: PHYSICIAN ASSISTANT

## 2022-04-14 PROCEDURE — 82077 ASSAY SPEC XCP UR&BREATH IA: CPT | Performed by: PHYSICIAN ASSISTANT

## 2022-04-14 PROCEDURE — 96374 THER/PROPH/DIAG INJ IV PUSH: CPT

## 2022-04-14 RX ORDER — SODIUM CHLORIDE 0.9 % (FLUSH) 0.9 %
10 SYRINGE (ML) INJECTION AS NEEDED
Status: DISCONTINUED | OUTPATIENT
Start: 2022-04-14 | End: 2022-04-15 | Stop reason: HOSPADM

## 2022-04-14 RX ORDER — LORAZEPAM 2 MG/ML
0.5 INJECTION INTRAMUSCULAR ONCE
Status: COMPLETED | OUTPATIENT
Start: 2022-04-14 | End: 2022-04-14

## 2022-04-14 RX ADMIN — Medication 10 ML: at 19:55

## 2022-04-14 RX ADMIN — LORAZEPAM 0.5 MG: 2 INJECTION INTRAMUSCULAR; INTRAVENOUS at 20:12

## 2022-04-14 NOTE — ED PROVIDER NOTES
Subjective       Patient is a 46-year-old male comes in complaining of chest pain since about 2 hours prior to arrival.  Patient states that he has shortness of breath with this as well.  Patient states his symptoms are not worse with exertion better with rest they are constant no matter what.  Patient also states he started having shaking and has arms bilaterally with onset of symptoms as well.  Patient states his pain is about an 8 out of 10 that is in the center of his lower chest and somewhat radiates to his right shoulder.  Patient is unsure if his right shoulder pain is due to chronic issues.  Patient states that he has had a significantly high amount of stress at work recently.  Family at bedside reports that patient has had several episodes of what appear to be anxiety attacks and other episodes of chest pain intermittently throughout the past month or 2.  Patient states that he sips about five or six half shot glasses of whiskey per day.  Patient states the last time he had alcohol was about 24 hours ago.  Patient otherwise denies new or worsening cough, fever, chills, nausea, vomiting, diarrhea, abdominal pain, urinary symptoms or swelling legs bilaterally.  Patient states he is a daily smoker but states he does not want to continue to smoke anymore and gave his cigarettes away since his chest pain started today.  Patient has a documented history of allergy to celecoxib.  Patient states he does not take any medicine regularly and reports that he does not have a primary care provider.  Patient denies any SI or HI.        Review of Systems   Constitutional: Negative for chills, fatigue and fever.   HENT: Negative for congestion, sore throat, tinnitus and trouble swallowing.    Eyes: Negative for photophobia, discharge and visual disturbance.   Respiratory: Negative for cough, shortness of breath and wheezing.    Cardiovascular: Negative for chest pain, palpitations and leg swelling.   Gastrointestinal:  Negative for abdominal pain, diarrhea, nausea and vomiting.   Genitourinary: Negative for dysuria, flank pain and urgency.   Musculoskeletal: Negative for arthralgias and myalgias.   Skin: Negative for rash.   Neurological: Negative for dizziness, syncope, light-headedness and headaches.   Psychiatric/Behavioral: Negative for confusion. The patient is nervous/anxious.        Past Medical History:   Diagnosis Date   • History of degenerative disc disease    • Lung mass        Allergies   Allergen Reactions   • Celecoxib Swelling     Hands and face        Past Surgical History:   Procedure Laterality Date   • CARPAL TUNNEL RELEASE WITH CUBITAL TUNNEL RELEASE         Family History   Problem Relation Age of Onset   • Asthma Sister    • No Known Problems Mother    • No Known Problems Father        Social History     Socioeconomic History   • Marital status: Single   Tobacco Use   • Smoking status: Current Every Day Smoker     Packs/day: 1.00     Years: 22.00     Pack years: 22.00     Types: Cigarettes   • Smokeless tobacco: Never Used   Substance and Sexual Activity   • Alcohol use: Yes     Alcohol/week: 2.0 standard drinks     Types: 2 Cans of beer per week     Comment: daily   • Drug use: No   • Sexual activity: Defer           Objective   Physical Exam  Vitals and nursing note reviewed.   Constitutional:       General: He is not in acute distress.     Appearance: He is well-developed. He is not diaphoretic.   HENT:      Head: Normocephalic and atraumatic.      Nose: Nose normal.      Mouth/Throat:      Pharynx: No oropharyngeal exudate.   Eyes:      Extraocular Movements: Extraocular movements intact.      Conjunctiva/sclera: Conjunctivae normal.      Pupils: Pupils are equal, round, and reactive to light.   Cardiovascular:      Rate and Rhythm: Normal rate and regular rhythm.      Heart sounds: Normal heart sounds.      Comments: S1, S2 audible.  Pulmonary:      Effort: Pulmonary effort is normal. No respiratory  "distress.      Breath sounds: Normal breath sounds. No wheezing, rhonchi or rales.      Comments: On room air.  Abdominal:      General: Bowel sounds are normal. There is no distension.      Palpations: Abdomen is soft.      Tenderness: There is no abdominal tenderness.   Musculoskeletal:         General: No tenderness or deformity. Normal range of motion.      Cervical back: Normal range of motion.      Right lower leg: No edema.      Left lower leg: No edema.   Skin:     General: Skin is warm.      Capillary Refill: Capillary refill takes less than 2 seconds.      Findings: No erythema or rash.   Neurological:      Mental Status: He is alert and oriented to person, place, and time.      Cranial Nerves: No cranial nerve deficit.   Psychiatric:         Mood and Affect: Mood normal.         Behavior: Behavior normal.         Procedures           ED Course  ED Course as of 04/14/22 2257   u Apr 14, 2022 2104 CIWA of 6 upon presentation  CIWA of 0 upon reevalulation after ativan [RL]      ED Course User Index  [RL] Sidney Shepard PA      /72   Pulse 80   Temp 98.5 °F (36.9 °C) (Temporal)   Resp 16   Ht 167.6 cm (66\")   Wt 56.6 kg (124 lb 12.5 oz)   SpO2 99%   BMI 20.14 kg/m²   Labs Reviewed   COMPREHENSIVE METABOLIC PANEL - Abnormal; Notable for the following components:       Result Value    Creatinine 0.73 (*)     CO2 21.0 (*)     All other components within normal limits    Narrative:     GFR Normal >60  Chronic Kidney Disease <60  Kidney Failure <15     CBC WITH AUTO DIFFERENTIAL - Abnormal; Notable for the following components:    MCH 33.7 (*)     Lymphocyte % 14.6 (*)     All other components within normal limits   TROPONIN (IN-HOUSE) - Normal    Narrative:     Troponin T Reference Range:  <= 0.03 ng/mL-   Negative for AMI  >0.03 ng/mL-     Abnormal for myocardial necrosis.  Clinicians would have to utilize clinical acumen, EKG, Troponin and serial changes to determine if it is an Acute Myocardial " Infarction or myocardial injury due to an underlying chronic condition.       Results may be falsely decreased if patient taking Biotin.     URINE DRUG SCREEN - Normal    Narrative:     Negative Thresholds Per Drugs Screened:    Amphetamines                 500 ng/ml  Barbiturates                 200 ng/ml  Benzodiazepines              100 ng/ml  Cocaine                      300 ng/ml  Methadone                    300 ng/ml  Opiates                      300 ng/ml  Oxycodone                    100 ng/ml  THC                           50 ng/ml    The Normal Value for all drugs tested is negative. This report includes final unconfirmed screening results to be used for medical treatment purposes only. Unconfirmed results must not be used for non-medical purposes such as employment or legal testing. Clinical consideration should be applied to any drug of abuse test, particularly when unconfirmed results are used.          All urine drugs of abuse requests without chain of custody are for medical screening purposes only.  False positives are possible.     D-DIMER, QUANTITATIVE - Normal    Narrative:     Reference Range  --------------------------------------------------------------------     < 0.50   Negative Predictive Value  0.50-0.59   Indeterminate    >= 0.60   Probable VTE             A very low percentage of patients with DVT may yield D-Dimer results   below the cut-off of 0.50 mg/L FEU.  This is known to be more   prevalent in patients with distal DVT.             Results of this test should always be interpreted in conjunction with   the patient's medical history, clinical presentation and other   findings.  Clinical diagnosis should not be based on the result of   INNOVANCE D-Dimer alone.   TROPONIN (IN-HOUSE) - Normal    Narrative:     Troponin T Reference Range:  <= 0.03 ng/mL-   Negative for AMI  >0.03 ng/mL-     Abnormal for myocardial necrosis.  Clinicians would have to utilize clinical acumen, EKG,  Troponin and serial changes to determine if it is an Acute Myocardial Infarction or myocardial injury due to an underlying chronic condition.       Results may be falsely decreased if patient taking Biotin.     ETHANOL    Narrative:     Plasma Ethanol Clinical Symptoms:    ETOH (%)               Clinical Symptom  .01-.05              No apparent influence  .03-.12              Euphoria, Diminished judgment and attention   .09-.25              Impaired comprehension, Muscle incoordination  .18-.30              Confusion, Staggered gait, Slurred speech  .25-.40              Markedly decreased response to stimuli, unable to stand or                        walk, vomitting, sleep or stupor  .35-.50              Comatose, Anesthesia, Subnormal body temperature       CBC AND DIFFERENTIAL    Narrative:     The following orders were created for panel order CBC & Differential.  Procedure                               Abnormality         Status                     ---------                               -----------         ------                     CBC Auto Differential[[id number]]        Abnormal            Final result                 Please view results for these tests on the individual orders.     XR Chest 1 View    Result Date: 4/14/2022  No acute cardiopulmonary abnormality is identified.  Electronically Signed By-Mariama Smith MD On:4/14/2022 8:37 PM This report was finalized on [id number] by  Mariama Smith MD.                                               MDM  Number of Diagnoses or Management Options     Amount and/or Complexity of Data Reviewed  Clinical lab tests: reviewed  Tests in the radiology section of CPT®: reviewed  Tests in the medicine section of CPT®: reviewed         Chart review: Patient has no prior cardiac work-up history.  Patient is a smoker but otherwise has a heart score of 3.  EKG: EKG reviewed by myself and interpreted by Dr. Schulte shows sinus rhythm 72 bpm, no ST elevation apparent.   "Similar to previous exam.    Imaging: Chest x-ray unremarkable for acute findings.  Labs: CBC and CMP are largely unremarkable for acute findings.  UDS negative.  Initial troponin negative.  Ethanol level negative.  D-dimer negative.    Vitals:  /72   Pulse 80   Temp 98.5 °F (36.9 °C) (Temporal)   Resp 16   Ht 167.6 cm (66\")   Wt 56.6 kg (124 lb 12.5 oz)   SpO2 99%   BMI 20.14 kg/m²     Medications given:    Medications   sodium chloride 0.9 % flush 10 mL (10 mL Intravenous Given 4/14/22 1955)   LORazepam (ATIVAN) injection 0.5 mg (0.5 mg Intravenous Given 4/14/22 2012)       Procedures:    MDM: Patient is a 46-year-old male comes in complaining of chest pain, shortness of breath and tremors of the hands bilaterally.  EKG showed no acute findings.  Chest x-ray shows no acute findings. Chest x-ray unremarkable for acute findings. CBC and CMP are largely unremarkable for acute findings.  UDS negative.  Initial and repeat troponin negative.  Ethanol level negative.  D-dimer negative.  Patient was given Ativan as patient appeared extremely anxious on exam.  Upon reevaluation after giving this medication patient symptoms completely resolved.  Patient is low risk heart score.  Patient to follow-up with primary care provider and was given instruction on how to set 1 up upon discharge.  Patient was given strict return precautions and voiced understanding.  Patient was also offered i'mmae as a resource as well.  Patient denies any SI, hallucinations or HI.  I spoke with patient at length regarding his alcohol use and regarding withdrawal symptoms and patient voiced understanding. See full discharge instructions for further details.  Results and plan discussed with patient and is agreeable with plan.    Final diagnoses:   Chest pain, unspecified type   Anxiety   Alcohol abuse       ED Disposition  ED Disposition     ED Disposition   Discharge    Condition   Stable    Comment   --             [facility] " [place] EMERGENCY DEPARTMENT  [address] [address] [phone]  Go in 1 day  As needed, If symptoms worsen    PATIENT CONNECTION - [facility] [id number] [phone]  Call in 1 day  To set up a primary care provider for follow-up appointment in 1 week's time.    [place] [id number] [facility] [address] [phone]  Go in 1 day  Can always call or go to [facility] for additional help with anxiety/alcohol use         Medication List      No changes were made to your prescriptions during this visit.          Sidney Shepard PA  04/14/22 1801

## 2022-04-15 NOTE — DISCHARGE INSTRUCTIONS
Please follow-up with primary care provider, if you do not have a primary care provider please call the patient connection number above to set this up.  Can always go to [facility] for further help with anxiety as well or alcohol use reasons.  Please come back to the ER if you have severe chest pain or shortness of breath or become extremely tremulous or have hallucinations as you will need reevaluation that time.

## 2022-04-17 LAB — QT INTERVAL: 363 MS
